# Patient Record
Sex: FEMALE | Race: WHITE | Employment: OTHER | URBAN - METROPOLITAN AREA
[De-identification: names, ages, dates, MRNs, and addresses within clinical notes are randomized per-mention and may not be internally consistent; named-entity substitution may affect disease eponyms.]

---

## 2017-01-12 ENCOUNTER — OFFICE VISIT (OUTPATIENT)
Dept: PULMONOLOGY | Facility: CLINIC | Age: 67
End: 2017-01-12

## 2017-01-12 VITALS
SYSTOLIC BLOOD PRESSURE: 133 MMHG | RESPIRATION RATE: 18 BRPM | DIASTOLIC BLOOD PRESSURE: 76 MMHG | BODY MASS INDEX: 38.67 KG/M2 | HEART RATE: 109 BPM | HEIGHT: 61 IN | WEIGHT: 204.81 LBS | OXYGEN SATURATION: 97 %

## 2017-01-12 DIAGNOSIS — G47.33 OBSTRUCTIVE SLEEP APNEA SYNDROME: Primary | ICD-10-CM

## 2017-01-12 PROCEDURE — 99212 OFFICE O/P EST SF 10 MIN: CPT | Performed by: INTERNAL MEDICINE

## 2017-01-12 PROCEDURE — 99243 OFF/OP CNSLTJ NEW/EST LOW 30: CPT | Performed by: INTERNAL MEDICINE

## 2017-01-12 NOTE — PROGRESS NOTES
Dear  Tanya Swanson  :           As you know, Cushing is a 52-year-old female who I am now evaluating for sleep apnea.     HISTORY OF PRESENT ILLNESS: The patient underwent polysomnography in 2008 at which time she was found to have 29 respiratory events per h examination is unremarkable with pupils equal round and reactive to light and accommodation. Neck without adenopathy, thyromegaly, JVD nor bruit. Lungs clear to auscultation and percussion. Cardiac regular rate and rhythm no murmur.  Abdomen nontender, with

## 2017-02-06 ENCOUNTER — OFFICE VISIT (OUTPATIENT)
Dept: INTERNAL MEDICINE CLINIC | Facility: CLINIC | Age: 67
End: 2017-02-06

## 2017-02-06 VITALS
TEMPERATURE: 98 F | HEART RATE: 88 BPM | HEIGHT: 62 IN | OXYGEN SATURATION: 98 % | WEIGHT: 205 LBS | RESPIRATION RATE: 19 BRPM | SYSTOLIC BLOOD PRESSURE: 135 MMHG | BODY MASS INDEX: 37.73 KG/M2 | DIASTOLIC BLOOD PRESSURE: 85 MMHG

## 2017-02-06 DIAGNOSIS — I10 ESSENTIAL HYPERTENSION: ICD-10-CM

## 2017-02-06 DIAGNOSIS — G47.33 OBSTRUCTIVE SLEEP APNEA SYNDROME: ICD-10-CM

## 2017-02-06 DIAGNOSIS — L98.9 SKIN LESION: ICD-10-CM

## 2017-02-06 DIAGNOSIS — E11.9 TYPE 2 DIABETES MELLITUS WITHOUT COMPLICATION, WITHOUT LONG-TERM CURRENT USE OF INSULIN (HCC): Primary | ICD-10-CM

## 2017-02-06 LAB
ALBUMIN SERPL BCP-MCNC: 3.9 G/DL (ref 3.5–4.8)
ALBUMIN/GLOB SERPL: 1.6 {RATIO} (ref 1–2)
ALP SERPL-CCNC: 78 U/L (ref 32–100)
ALT SERPL-CCNC: 34 U/L (ref 14–54)
ANION GAP SERPL CALC-SCNC: 9 MMOL/L (ref 0–18)
AST SERPL-CCNC: 32 U/L (ref 15–41)
BILIRUB SERPL-MCNC: 0.3 MG/DL (ref 0.3–1.2)
BUN SERPL-MCNC: 15 MG/DL (ref 8–20)
BUN/CREAT SERPL: 19.2 (ref 10–20)
CALCIUM SERPL-MCNC: 9.2 MG/DL (ref 8.5–10.5)
CHLORIDE SERPL-SCNC: 104 MMOL/L (ref 95–110)
CO2 SERPL-SCNC: 26 MMOL/L (ref 22–32)
CREAT SERPL-MCNC: 0.78 MG/DL (ref 0.5–1.5)
GLOBULIN PLAS-MCNC: 2.4 G/DL (ref 2.5–3.7)
GLUCOSE SERPL-MCNC: 101 MG/DL (ref 70–99)
OSMOLALITY UR CALC.SUM OF ELEC: 289 MOSM/KG (ref 275–295)
POTASSIUM SERPL-SCNC: 4 MMOL/L (ref 3.3–5.1)
PROT SERPL-MCNC: 6.3 G/DL (ref 5.9–8.4)
SODIUM SERPL-SCNC: 139 MMOL/L (ref 136–144)

## 2017-02-06 PROCEDURE — 36415 COLL VENOUS BLD VENIPUNCTURE: CPT | Performed by: INTERNAL MEDICINE

## 2017-02-06 PROCEDURE — 99213 OFFICE O/P EST LOW 20 MIN: CPT | Performed by: INTERNAL MEDICINE

## 2017-02-06 PROCEDURE — 99212 OFFICE O/P EST SF 10 MIN: CPT | Performed by: INTERNAL MEDICINE

## 2017-02-06 RX ORDER — LISINOPRIL 10 MG/1
10 TABLET ORAL
Qty: 90 TABLET | Refills: 1 | Status: SHIPPED | OUTPATIENT
Start: 2017-02-06 | End: 2017-08-07

## 2017-02-06 RX ORDER — SIMVASTATIN 80 MG
TABLET ORAL
Qty: 90 TABLET | Refills: 1 | Status: SHIPPED | OUTPATIENT
Start: 2017-02-06 | End: 2017-08-07

## 2017-02-06 RX ORDER — PAROXETINE HYDROCHLORIDE 20 MG/1
TABLET, FILM COATED ORAL
Qty: 90 TABLET | Refills: 1 | Status: SHIPPED | OUTPATIENT
Start: 2017-02-06 | End: 2017-08-07

## 2017-02-06 NOTE — PATIENT INSTRUCTIONS
ASSESSMENT/PLAN:   Diagnoses and all orders for this visit:    Type 2 diabetes mellitus without complication, without long-term current use of insulin (HCC)  Sugar overall has done very well, will check labs today.  Encouraged her to continue the regular

## 2017-02-06 NOTE — PROGRESS NOTES
HPI:    Patient ID: Belkis Angeles is a 73year old female. HPI  Diabetes  Patient here for follow up of Diabetes. Has been taking medications regularly. Currently taking   Checks sugars 1 times daily.   Fasting sugars average 110-120  exercise constipation.    Skin:        Skin lesion left upper eyelid           Current Outpatient Prescriptions:  PAROXETINE HCL 20 MG Oral Tab TAKE 1 TABLET BY MOUTH EVERY MORNING Disp: 90 tablet Rfl: 0   SIMVASTATIN 80 MG Oral Tab TAKE 1 TABLET BY MOUTH AT BEDTIME mg total) by mouth once daily.  -     MetFORMIN HCl 500 MG Oral Tab; Take 1 tablet (500 mg total) by mouth 2 (two) times daily with meals.  -     PARoxetine HCl 20 MG Oral Tab; Take 1 tablet (20 mg total) by mouth. -     simvastatin 80 MG Oral Tab;  Take 1

## 2017-02-07 LAB — HBA1C MFR BLD: 6.5 % (ref 4–6)

## 2017-02-16 ENCOUNTER — OFFICE VISIT (OUTPATIENT)
Dept: SLEEP CENTER | Age: 67
End: 2017-02-16
Attending: INTERNAL MEDICINE
Payer: COMMERCIAL

## 2017-02-16 DIAGNOSIS — Z76.89 SLEEP CONCERN: Primary | ICD-10-CM

## 2017-02-16 PROCEDURE — 95811 POLYSOM 6/>YRS CPAP 4/> PARM: CPT

## 2017-02-19 NOTE — PROCEDURES
[facility]  Accredited by the [employer] ([employer])    PATIENT'S NAME: Akiko Jaime PHYSICIAN: Artice Simmonds, MD   REFERRING PHYSICIAN: Artice Simmonds, MD   PATIENT ACCOUNT #: [de-identified] LOCATION 12.1 events per hour. There were 29 periodic limb movements for a periodic limb movement index of 4.3 events per hour of which 0.7 per hour were associated with arousal.  The lowest desaturation was to 87%.   The average heart rate was 74 beats per minute,

## 2017-02-20 ENCOUNTER — TELEPHONE (OUTPATIENT)
Dept: PULMONOLOGY | Facility: CLINIC | Age: 67
End: 2017-02-20

## 2017-02-20 DIAGNOSIS — G47.33 OSA (OBSTRUCTIVE SLEEP APNEA): Primary | ICD-10-CM

## 2017-02-21 NOTE — TELEPHONE ENCOUNTER
V/o received from Dr. Bret Spurling for BiPAP 18/14 CWP with Bi-Flex set to 3 CWP using a ResMed AirFit medium F10 full face mask with heated in-line humidification.  DME rx, Titration, PSG, Office Visit Encounter from 1/12, & Pt Reg Facesheet faxed to [facility] [place] at #[phone]

## 2017-04-17 ENCOUNTER — OFFICE VISIT (OUTPATIENT)
Dept: PULMONOLOGY | Facility: CLINIC | Age: 67
End: 2017-04-17

## 2017-04-17 VITALS
RESPIRATION RATE: 18 BRPM | SYSTOLIC BLOOD PRESSURE: 138 MMHG | HEART RATE: 86 BPM | OXYGEN SATURATION: 97 % | BODY MASS INDEX: 38.16 KG/M2 | DIASTOLIC BLOOD PRESSURE: 80 MMHG | HEIGHT: 62 IN | WEIGHT: 207.38 LBS

## 2017-04-17 DIAGNOSIS — G47.33 OBSTRUCTIVE SLEEP APNEA SYNDROME: Primary | ICD-10-CM

## 2017-04-17 PROCEDURE — 99212 OFFICE O/P EST SF 10 MIN: CPT | Performed by: INTERNAL MEDICINE

## 2017-04-17 PROCEDURE — 99213 OFFICE O/P EST LOW 20 MIN: CPT | Performed by: INTERNAL MEDICINE

## 2017-04-17 NOTE — PROGRESS NOTES
The patient is 52-year-old female who I know well from prior evaluation comes in now for follow-up. She has severe sleep apnea which is worse in the supine position and she is now set up with BiPAP 21/17 cmH2O pressure.   She notes that she is more rested

## 2017-04-26 ENCOUNTER — OFFICE VISIT (OUTPATIENT)
Dept: GASTROENTEROLOGY | Facility: CLINIC | Age: 67
End: 2017-04-26

## 2017-04-26 ENCOUNTER — TELEPHONE (OUTPATIENT)
Dept: GASTROENTEROLOGY | Facility: CLINIC | Age: 67
End: 2017-04-26

## 2017-04-26 VITALS
WEIGHT: 204.81 LBS | SYSTOLIC BLOOD PRESSURE: 134 MMHG | DIASTOLIC BLOOD PRESSURE: 78 MMHG | HEIGHT: 62 IN | BODY MASS INDEX: 37.69 KG/M2 | HEART RATE: 98 BPM

## 2017-04-26 DIAGNOSIS — Z12.11 SCREENING FOR COLORECTAL CANCER: Primary | ICD-10-CM

## 2017-04-26 DIAGNOSIS — Z12.11 SCREENING FOR COLON CANCER: Primary | ICD-10-CM

## 2017-04-26 DIAGNOSIS — Z12.12 SCREENING FOR COLORECTAL CANCER: Primary | ICD-10-CM

## 2017-04-26 PROCEDURE — 99212 OFFICE O/P EST SF 10 MIN: CPT | Performed by: INTERNAL MEDICINE

## 2017-04-26 PROCEDURE — 99242 OFF/OP CONSLTJ NEW/EST SF 20: CPT | Performed by: INTERNAL MEDICINE

## 2017-04-26 NOTE — TELEPHONE ENCOUNTER
Scheduled for:  Colonoscopy  Provider Name: Raymond  Date:  5/31/17  Location:  [facility]  Sedation: IV   Time:  730am (630am arrival)  Prep: Suprep (pt notified rx sent in and to call office if too expensive or not covered)  Meds/Allergies Reconciled?:  ye

## 2017-04-26 NOTE — PATIENT INSTRUCTIONS
1.  Schedule screening colonoscopy following a Suprep. 2.  Hold diabetic medication the evening before and the morning of the procedure.

## 2017-04-26 NOTE — PROGRESS NOTES
HPI:    Patient ID: Nir Joyce is a 73year old female. HPI  The patient is referred by Dr. Beau Montez for colorectal cancer screening. She has had 2 colonoscopies previously at [facility].  The first revealed polyps.   The second did no Vitamins-Minerals (MULTI-VITAMIN/MINERALS) Oral Tab Take 1 tablet by mouth daily. Disp:  Rfl:    Fexofenadine HCl (ALLEGRA) 180 MG Oral Tab Take 180 mg by mouth daily.  Disp:  Rfl:      Allergies:  Aspirin                     Comment:Other reaction(s): Unkn Total Bilirubin      0.3-1.2 mg/dL 0.3 0.5   TOTAL PROTEIN      5.9-8.4 g/dL 6.3 6.6   Albumin      3.5-4.8 g/dL 3.9 3.9   GLOBULIN, TOTAL      2.5-3.7 g/dL 2.4 (L) 2.7   A/G RATIO      1.0-2.0 1.6 1.4   GFR/NON-      >60  >60   GFR/AFRIC

## 2017-05-01 ENCOUNTER — TELEPHONE (OUTPATIENT)
Dept: GASTROENTEROLOGY | Facility: CLINIC | Age: 67
End: 2017-05-01

## 2017-05-04 NOTE — TELEPHONE ENCOUNTER
Rescheduled for:  Colonscopy [id number]  Provider Name:  Dr. Isaias Badillo  Date:    From-5/31/17  To-6/7/17  Location:  [facility]  Sedation:  IV  Time:   From-0730  To-1030 (pt is aware to arrive at 0930)  Prep:  Suprep, mailed new instructions 5/4/17  Meds/Allergies Reconcile

## 2017-05-09 ENCOUNTER — PATIENT MESSAGE (OUTPATIENT)
Dept: INTERNAL MEDICINE CLINIC | Facility: CLINIC | Age: 67
End: 2017-05-09

## 2017-05-10 NOTE — TELEPHONE ENCOUNTER
Message left for Durene Severin in [facility] AT [facility]

## 2017-05-10 NOTE — TELEPHONE ENCOUNTER
Per Gabriela Roberts Dr, Vicente Martines is not in [facility] network.   Spoke to patient she was going to call her insurance, she has been going to this doctor for 30 years

## 2017-06-07 ENCOUNTER — SURGERY (OUTPATIENT)
Age: 67
End: 2017-06-07

## 2017-06-07 ENCOUNTER — HOSPITAL ENCOUNTER (OUTPATIENT)
Facility: HOSPITAL | Age: 67
Setting detail: HOSPITAL OUTPATIENT SURGERY
Discharge: HOME | End: 2017-06-07
Attending: INTERNAL MEDICINE | Admitting: INTERNAL MEDICINE
Payer: COMMERCIAL

## 2017-06-07 PROCEDURE — 45385 COLONOSCOPY W/LESION REMOVAL: CPT | Performed by: INTERNAL MEDICINE

## 2017-06-07 PROCEDURE — 0DBM8ZX EXCISION OF DESCENDING COLON, VIA NATURAL OR ARTIFICIAL OPENING ENDOSCOPIC, DIAGNOSTIC: ICD-10-PCS | Performed by: INTERNAL MEDICINE

## 2017-06-07 PROCEDURE — 99152 MOD SED SAME PHYS/QHP 5/>YRS: CPT | Performed by: INTERNAL MEDICINE

## 2017-06-07 PROCEDURE — 0DBN8ZX EXCISION OF SIGMOID COLON, VIA NATURAL OR ARTIFICIAL OPENING ENDOSCOPIC, DIAGNOSTIC: ICD-10-PCS | Performed by: INTERNAL MEDICINE

## 2017-06-07 PROCEDURE — 0DBL8ZX EXCISION OF TRANSVERSE COLON, VIA NATURAL OR ARTIFICIAL OPENING ENDOSCOPIC, DIAGNOSTIC: ICD-10-PCS | Performed by: INTERNAL MEDICINE

## 2017-06-07 PROCEDURE — 0DBP8ZX EXCISION OF RECTUM, VIA NATURAL OR ARTIFICIAL OPENING ENDOSCOPIC, DIAGNOSTIC: ICD-10-PCS | Performed by: INTERNAL MEDICINE

## 2017-06-07 PROCEDURE — 99153 MOD SED SAME PHYS/QHP EA: CPT | Performed by: INTERNAL MEDICINE

## 2017-06-07 RX ORDER — SODIUM CHLORIDE, SODIUM LACTATE, POTASSIUM CHLORIDE, CALCIUM CHLORIDE 600; 310; 30; 20 MG/100ML; MG/100ML; MG/100ML; MG/100ML
INJECTION, SOLUTION INTRAVENOUS CONTINUOUS
Status: DISCONTINUED | OUTPATIENT
Start: 2017-06-07 | End: 2017-06-07

## 2017-06-07 RX ORDER — MIDAZOLAM HYDROCHLORIDE 1 MG/ML
INJECTION INTRAMUSCULAR; INTRAVENOUS
Status: DISCONTINUED | OUTPATIENT
Start: 2017-06-07 | End: 2017-06-07

## 2017-06-07 RX ORDER — MIDAZOLAM HYDROCHLORIDE 1 MG/ML
1 INJECTION INTRAMUSCULAR; INTRAVENOUS EVERY 5 MIN PRN
Status: DISCONTINUED | OUTPATIENT
Start: 2017-06-07 | End: 2017-06-07

## 2017-06-07 RX ORDER — SODIUM CHLORIDE 0.9 % (FLUSH) 0.9 %
10 SYRINGE (ML) INJECTION AS NEEDED
Status: DISCONTINUED | OUTPATIENT
Start: 2017-06-07 | End: 2017-06-07

## 2017-06-07 NOTE — H&P
History & Physical Examination    Patient Name: Yanet Salas  MRN: [id number]  CSN: [id number]  YOB: 1950    Diagnosis: Colorectal cancer screening        Prescriptions prior to admission:  Na Sulfate-K Sulfate-Mg Sulf (SUPREP BOWEL WI headaches    • Diabetes mellitus ([facility] [address]    • Essential hypertension    • Obesity    • Colon polyp    • High blood pressure          Past Surgical History    BREAST BIOPSY Left     MYOMECTOMY 5/> INTRAMURAL MYOMAS &/OR TOTAL WT >250 GMS, ABDOMINAL APPROACH

## 2017-06-07 NOTE — OPERATIVE REPORT
[facility] Endoscopy Report      Date of Procedure:  06/07/2017      Preoperative Diagnosis:  Colorectal cancer screening      Postoperative Diagnosis:  1. Colon polyps  2.   Minimal colonic diverticulosis      Procedure:    Colonoscopy wit polyp which was cold snare excised and retrieved via suction. Inspection of all the above sites revealed no evidence of ongoing bleeding. There were a few tiny diverticula seen in at least the transverse colon without current complication.   There were

## 2017-06-08 VITALS
OXYGEN SATURATION: 96 % | RESPIRATION RATE: 10 BRPM | BODY MASS INDEX: 36.8 KG/M2 | WEIGHT: 200 LBS | SYSTOLIC BLOOD PRESSURE: 130 MMHG | DIASTOLIC BLOOD PRESSURE: 64 MMHG | HEART RATE: 75 BPM | HEIGHT: 62 IN

## 2017-06-12 ENCOUNTER — TELEPHONE (OUTPATIENT)
Dept: GASTROENTEROLOGY | Facility: CLINIC | Age: 67
End: 2017-06-12

## 2017-06-12 NOTE — TELEPHONE ENCOUNTER
----- Message from Israel Cox MD sent at 6/9/2017  7:35 PM CDT -----  Please see the following my chart message sent to the patient:    \"Marino,    I left a message on your voicemail.       Your colonoscopy revealed 6 small polyps which were remov

## 2017-08-07 ENCOUNTER — OFFICE VISIT (OUTPATIENT)
Dept: INTERNAL MEDICINE CLINIC | Facility: CLINIC | Age: 67
End: 2017-08-07

## 2017-08-07 VITALS
HEIGHT: 62 IN | WEIGHT: 209 LBS | HEART RATE: 88 BPM | RESPIRATION RATE: 20 BRPM | DIASTOLIC BLOOD PRESSURE: 76 MMHG | SYSTOLIC BLOOD PRESSURE: 138 MMHG | BODY MASS INDEX: 38.46 KG/M2 | TEMPERATURE: 98 F

## 2017-08-07 DIAGNOSIS — E11.9 TYPE 2 DIABETES MELLITUS WITHOUT COMPLICATION, WITHOUT LONG-TERM CURRENT USE OF INSULIN (HCC): Primary | ICD-10-CM

## 2017-08-07 DIAGNOSIS — Z78.0 MENOPAUSE: ICD-10-CM

## 2017-08-07 DIAGNOSIS — Z12.31 VISIT FOR SCREENING MAMMOGRAM: ICD-10-CM

## 2017-08-07 DIAGNOSIS — I10 ESSENTIAL HYPERTENSION: ICD-10-CM

## 2017-08-07 PROCEDURE — 99212 OFFICE O/P EST SF 10 MIN: CPT | Performed by: INTERNAL MEDICINE

## 2017-08-07 PROCEDURE — 90471 IMMUNIZATION ADMIN: CPT | Performed by: INTERNAL MEDICINE

## 2017-08-07 PROCEDURE — 99213 OFFICE O/P EST LOW 20 MIN: CPT | Performed by: INTERNAL MEDICINE

## 2017-08-07 PROCEDURE — 90736 HZV VACCINE LIVE SUBQ: CPT | Performed by: INTERNAL MEDICINE

## 2017-08-07 PROCEDURE — 90472 IMMUNIZATION ADMIN EACH ADD: CPT | Performed by: INTERNAL MEDICINE

## 2017-08-07 PROCEDURE — 90670 PCV13 VACCINE IM: CPT | Performed by: INTERNAL MEDICINE

## 2017-08-07 RX ORDER — SIMVASTATIN 80 MG
TABLET ORAL
Qty: 90 TABLET | Refills: 1 | Status: SHIPPED | OUTPATIENT
Start: 2017-08-07 | End: 2018-02-07

## 2017-08-07 RX ORDER — PAROXETINE HYDROCHLORIDE 20 MG/1
TABLET, FILM COATED ORAL
Qty: 90 TABLET | Refills: 1 | Status: SHIPPED | OUTPATIENT
Start: 2017-08-07 | End: 2018-02-07

## 2017-08-07 RX ORDER — LISINOPRIL 10 MG/1
10 TABLET ORAL
Qty: 90 TABLET | Refills: 1 | Status: SHIPPED | OUTPATIENT
Start: 2017-08-07 | End: 2018-02-07

## 2017-08-07 NOTE — PATIENT INSTRUCTIONS
ASSESSMENT/PLAN:   Diagnoses and all orders for this visit:    Type 2 diabetes mellitus without complication, without long-term current use of insulin (HCC)  -     COMP METABOLIC PANEL (14); Future  -     HEMOGLOBIN A1C; Future  -     LIPID PANEL;  Future

## 2017-08-07 NOTE — PROGRESS NOTES
HPI:    Patient ID: Ignacio Vilchis is a 78year old female. HPI  Diabetes  Patient here for follow up of Diabetes. Has been taking medications regularly. Currently taking metformin only  Checks sugars 1 times daily.   Fasting sugars average low Inhaler Rfl: 3   Multiple Vitamins-Minerals (MULTI-VITAMIN/MINERALS) Oral Tab Take 1 tablet by mouth daily. Disp:  Rfl:    Fexofenadine HCl (ALLEGRA) 180 MG Oral Tab Take 180 mg by mouth daily.  Disp:  Rfl:      Allergies:  Aspirin                     Comme meals.  -     simvastatin 80 MG Oral Tab; TAKE 1 TABLET BY MOUTH AT BEDTIME  -     PARoxetine HCl 20 MG Oral Tab; TAKE 1 TABLET BY MOUTH EVERY MORNING            [id number]

## 2017-11-07 ENCOUNTER — TELEPHONE (OUTPATIENT)
Dept: INTERNAL MEDICINE CLINIC | Facility: CLINIC | Age: 67
End: 2017-11-07

## 2017-11-07 NOTE — TELEPHONE ENCOUNTER
Patient informed is due for diabetic eye exam, states will schedule eye exam appointment with Dr Vicente Martines and will fax report. Fax number given to patient [phone] Miguel Lima.

## 2018-01-23 NOTE — TELEPHONE ENCOUNTER
From: Donna Welsh  Sent: 1/23/2018 8:37 AM CST  Subject: Medication Renewal Request    Donna Welsh would like a refill of the following medications:     MetFORMIN HCl 500 MG Oral Tab Denver Brantley MD]   Patient Comment: I will run

## 2018-01-23 NOTE — TELEPHONE ENCOUNTER
Refilled x 1 per protocol to last until appt.     Diabetes Medications  Protocol Criteria:  · Appointment scheduled in the past 6 months or the next 3 months  · A1C < 7.5 in the past 6 months  · Creatinine in the past 12 months  · Creatinine result < 1.5

## 2018-02-03 ENCOUNTER — APPOINTMENT (OUTPATIENT)
Dept: LAB | Facility: HOSPITAL | Age: 68
End: 2018-02-03
Attending: INTERNAL MEDICINE
Payer: COMMERCIAL

## 2018-02-03 DIAGNOSIS — E11.9 TYPE 2 DIABETES MELLITUS WITHOUT COMPLICATION, WITHOUT LONG-TERM CURRENT USE OF INSULIN (HCC): ICD-10-CM

## 2018-02-03 LAB
ALBUMIN SERPL BCP-MCNC: 3.9 G/DL (ref 3.5–4.8)
ALBUMIN/GLOB SERPL: 1.6 {RATIO} (ref 1–2)
ALP SERPL-CCNC: 56 U/L (ref 32–100)
ALT SERPL-CCNC: 19 U/L (ref 14–54)
ANION GAP SERPL CALC-SCNC: 10 MMOL/L (ref 0–18)
AST SERPL-CCNC: 20 U/L (ref 15–41)
BILIRUB SERPL-MCNC: 0.6 MG/DL (ref 0.3–1.2)
BUN SERPL-MCNC: 12 MG/DL (ref 8–20)
BUN/CREAT SERPL: 18.5 (ref 10–20)
CALCIUM SERPL-MCNC: 9.2 MG/DL (ref 8.5–10.5)
CHLORIDE SERPL-SCNC: 102 MMOL/L (ref 95–110)
CHOLEST SERPL-MCNC: 158 MG/DL (ref 110–200)
CO2 SERPL-SCNC: 28 MMOL/L (ref 22–32)
CREAT SERPL-MCNC: 0.65 MG/DL (ref 0.5–1.5)
CREAT UR-MCNC: 125.7 MG/DL
GLOBULIN PLAS-MCNC: 2.5 G/DL (ref 2.5–3.7)
GLUCOSE SERPL-MCNC: 119 MG/DL (ref 70–99)
HBA1C MFR BLD: 6.5 % (ref 4–6)
HDLC SERPL-MCNC: 32 MG/DL
LDLC SERPL CALC-MCNC: 83 MG/DL (ref 0–99)
MICROALBUMIN UR-MCNC: 0.3 MG/DL (ref 0–1.8)
MICROALBUMIN/CREAT UR: 2.4 MG/G{CREAT} (ref 0–20)
NONHDLC SERPL-MCNC: 126 MG/DL
OSMOLALITY UR CALC.SUM OF ELEC: 291 MOSM/KG (ref 275–295)
POTASSIUM SERPL-SCNC: 4.4 MMOL/L (ref 3.3–5.1)
PROT SERPL-MCNC: 6.4 G/DL (ref 5.9–8.4)
SODIUM SERPL-SCNC: 140 MMOL/L (ref 136–144)
TRIGL SERPL-MCNC: 215 MG/DL (ref 1–149)

## 2018-02-03 PROCEDURE — 82570 ASSAY OF URINE CREATININE: CPT

## 2018-02-03 PROCEDURE — 82043 UR ALBUMIN QUANTITATIVE: CPT

## 2018-02-03 PROCEDURE — 36415 COLL VENOUS BLD VENIPUNCTURE: CPT

## 2018-02-03 PROCEDURE — 83036 HEMOGLOBIN GLYCOSYLATED A1C: CPT

## 2018-02-03 PROCEDURE — 80061 LIPID PANEL: CPT

## 2018-02-03 PROCEDURE — 80053 COMPREHEN METABOLIC PANEL: CPT

## 2018-02-07 ENCOUNTER — OFFICE VISIT (OUTPATIENT)
Dept: INTERNAL MEDICINE CLINIC | Facility: CLINIC | Age: 68
End: 2018-02-07

## 2018-02-07 VITALS
WEIGHT: 206 LBS | DIASTOLIC BLOOD PRESSURE: 72 MMHG | SYSTOLIC BLOOD PRESSURE: 136 MMHG | BODY MASS INDEX: 37.91 KG/M2 | HEART RATE: 87 BPM | HEIGHT: 62 IN

## 2018-02-07 DIAGNOSIS — I10 ESSENTIAL HYPERTENSION: ICD-10-CM

## 2018-02-07 DIAGNOSIS — E11.9 TYPE 2 DIABETES MELLITUS WITHOUT COMPLICATION, WITHOUT LONG-TERM CURRENT USE OF INSULIN (HCC): Primary | ICD-10-CM

## 2018-02-07 PROCEDURE — 99214 OFFICE O/P EST MOD 30 MIN: CPT | Performed by: INTERNAL MEDICINE

## 2018-02-07 PROCEDURE — 99212 OFFICE O/P EST SF 10 MIN: CPT | Performed by: INTERNAL MEDICINE

## 2018-02-07 RX ORDER — PAROXETINE HYDROCHLORIDE 20 MG/1
TABLET, FILM COATED ORAL
Qty: 90 TABLET | Refills: 1 | Status: SHIPPED | OUTPATIENT
Start: 2018-02-07 | End: 2018-08-22

## 2018-02-07 RX ORDER — LISINOPRIL 10 MG/1
10 TABLET ORAL
Qty: 90 TABLET | Refills: 1 | Status: SHIPPED | OUTPATIENT
Start: 2018-02-07 | End: 2018-08-22

## 2018-02-07 RX ORDER — SIMVASTATIN 80 MG
TABLET ORAL
Qty: 90 TABLET | Refills: 1 | Status: SHIPPED | OUTPATIENT
Start: 2018-02-07 | End: 2018-08-22

## 2018-02-07 NOTE — PATIENT INSTRUCTIONS
ASSESSMENT/PLAN:   Diagnoses and all orders for this visit:    Type 2 diabetes mellitus without complication, without long-term current use of insulin (HCC)  Sugars overall doing very well, to continue the regular exercise and diet  Essential hypertension

## 2018-02-07 NOTE — PROGRESS NOTES
HPI:    Patient ID: Carlie Morataya is a 78year old female. HPI  Diabetes  Patient here for follow up of Diabetes. Has been taking medications regularly.     Currently taking metformin only  Checks sugars not checking, machine broke, ordered new m shortness of breath. Cardiovascular: Negative for chest pain, palpitations and leg swelling.            Current Outpatient Prescriptions:  simvastatin 80 MG Oral Tab TAKE 1 TABLET BY MOUTH AT BEDTIME Disp: 90 tablet Rfl: 1   PARoxetine HCl 20 MG Oral Tab and get visual fields if this bothers her. Other orders  -     simvastatin 80 MG Oral Tab; TAKE 1 TABLET BY MOUTH AT BEDTIME  -     PARoxetine HCl 20 MG Oral Tab; TAKE 1 TABLET BY MOUTH EVERY MORNING  -     MetFORMIN HCl 500 MG Oral Tab;  Take 1 tablet

## 2018-03-31 ENCOUNTER — HOSPITAL ENCOUNTER (OUTPATIENT)
Dept: BONE DENSITY | Age: 68
Discharge: HOME | End: 2018-03-31
Attending: INTERNAL MEDICINE
Payer: COMMERCIAL

## 2018-03-31 ENCOUNTER — HOSPITAL ENCOUNTER (OUTPATIENT)
Dept: MAMMOGRAPHY | Age: 68
Discharge: HOME | End: 2018-03-31
Attending: INTERNAL MEDICINE
Payer: COMMERCIAL

## 2018-03-31 DIAGNOSIS — Z78.0 MENOPAUSE: ICD-10-CM

## 2018-03-31 DIAGNOSIS — Z12.31 VISIT FOR SCREENING MAMMOGRAM: ICD-10-CM

## 2018-03-31 PROCEDURE — 77067 SCR MAMMO BI INCL CAD: CPT | Performed by: INTERNAL MEDICINE

## 2018-03-31 PROCEDURE — 77080 DXA BONE DENSITY AXIAL: CPT | Performed by: INTERNAL MEDICINE

## 2018-05-31 NOTE — PROGRESS NOTES
Patient given written instructions regarding labs, imaging, medications, referrals, dietary and lifestyle management, and return visit.    Yoav Wakefield MD       Sent results via [address]

## 2018-08-06 ENCOUNTER — TELEPHONE (OUTPATIENT)
Dept: INTERNAL MEDICINE CLINIC | Facility: CLINIC | Age: 68
End: 2018-08-06

## 2018-08-06 DIAGNOSIS — E11.9 TYPE 2 DIABETES MELLITUS WITHOUT COMPLICATION, WITHOUT LONG-TERM CURRENT USE OF INSULIN (HCC): Primary | ICD-10-CM

## 2018-08-06 NOTE — TELEPHONE ENCOUNTER
Patient called requesting orders for labs that she is due for, please call patient when orders are completed.

## 2018-08-21 ENCOUNTER — LAB ENCOUNTER (OUTPATIENT)
Dept: LAB | Facility: HOSPITAL | Age: 68
End: 2018-08-21
Attending: INTERNAL MEDICINE
Payer: COMMERCIAL

## 2018-08-21 DIAGNOSIS — E11.9 TYPE 2 DIABETES MELLITUS WITHOUT COMPLICATION, WITHOUT LONG-TERM CURRENT USE OF INSULIN (HCC): ICD-10-CM

## 2018-08-21 LAB
ALBUMIN SERPL BCP-MCNC: 4 G/DL (ref 3.5–4.8)
ALBUMIN/GLOB SERPL: 1.7 {RATIO} (ref 1–2)
ALP SERPL-CCNC: 69 U/L (ref 32–100)
ALT SERPL-CCNC: 21 U/L (ref 14–54)
ANION GAP SERPL CALC-SCNC: 8 MMOL/L (ref 0–18)
AST SERPL-CCNC: 21 U/L (ref 15–41)
BILIRUB SERPL-MCNC: 0.4 MG/DL (ref 0.3–1.2)
BUN SERPL-MCNC: 14 MG/DL (ref 8–20)
BUN/CREAT SERPL: 18.9 (ref 10–20)
CALCIUM SERPL-MCNC: 9.2 MG/DL (ref 8.5–10.5)
CHLORIDE SERPL-SCNC: 102 MMOL/L (ref 95–110)
CO2 SERPL-SCNC: 27 MMOL/L (ref 22–32)
CREAT SERPL-MCNC: 0.74 MG/DL (ref 0.5–1.5)
GLOBULIN PLAS-MCNC: 2.4 G/DL (ref 2.5–3.7)
GLUCOSE SERPL-MCNC: 92 MG/DL (ref 70–99)
OSMOLALITY UR CALC.SUM OF ELEC: 284 MOSM/KG (ref 275–295)
PATIENT FASTING: NO
POTASSIUM SERPL-SCNC: 4.2 MMOL/L (ref 3.3–5.1)
PROT SERPL-MCNC: 6.4 G/DL (ref 5.9–8.4)
SODIUM SERPL-SCNC: 137 MMOL/L (ref 136–144)

## 2018-08-21 PROCEDURE — 83036 HEMOGLOBIN GLYCOSYLATED A1C: CPT

## 2018-08-21 PROCEDURE — 36415 COLL VENOUS BLD VENIPUNCTURE: CPT

## 2018-08-21 PROCEDURE — 80053 COMPREHEN METABOLIC PANEL: CPT

## 2018-08-22 ENCOUNTER — OFFICE VISIT (OUTPATIENT)
Dept: INTERNAL MEDICINE CLINIC | Facility: CLINIC | Age: 68
End: 2018-08-22
Payer: COMMERCIAL

## 2018-08-22 ENCOUNTER — TELEPHONE (OUTPATIENT)
Dept: INTERNAL MEDICINE CLINIC | Facility: CLINIC | Age: 68
End: 2018-08-22

## 2018-08-22 VITALS
HEART RATE: 108 BPM | DIASTOLIC BLOOD PRESSURE: 76 MMHG | BODY MASS INDEX: 36.25 KG/M2 | WEIGHT: 197 LBS | SYSTOLIC BLOOD PRESSURE: 135 MMHG | HEIGHT: 62 IN

## 2018-08-22 DIAGNOSIS — I10 ESSENTIAL HYPERTENSION: ICD-10-CM

## 2018-08-22 DIAGNOSIS — G47.33 OBSTRUCTIVE SLEEP APNEA SYNDROME: ICD-10-CM

## 2018-08-22 DIAGNOSIS — E11.9 TYPE 2 DIABETES MELLITUS WITHOUT COMPLICATION, WITHOUT LONG-TERM CURRENT USE OF INSULIN (HCC): Primary | ICD-10-CM

## 2018-08-22 LAB — HBA1C MFR BLD: 6.3 % (ref 4–6)

## 2018-08-22 PROCEDURE — 99213 OFFICE O/P EST LOW 20 MIN: CPT | Performed by: INTERNAL MEDICINE

## 2018-08-22 RX ORDER — PAROXETINE HYDROCHLORIDE 20 MG/1
TABLET, FILM COATED ORAL
Qty: 90 TABLET | Refills: 1 | Status: SHIPPED | OUTPATIENT
Start: 2018-08-22 | End: 2019-02-20

## 2018-08-22 RX ORDER — SIMVASTATIN 80 MG
TABLET ORAL
Qty: 90 TABLET | Refills: 1 | Status: SHIPPED | OUTPATIENT
Start: 2018-08-22 | End: 2019-02-20

## 2018-08-22 RX ORDER — LISINOPRIL 10 MG/1
10 TABLET ORAL
Qty: 90 TABLET | Refills: 1 | Status: SHIPPED | OUTPATIENT
Start: 2018-08-22 | End: 2019-02-20

## 2018-08-22 NOTE — TELEPHONE ENCOUNTER
Rx request for Simvastatin 80 mg, paroxetine HCI 20 mg, Metformin  mg and Lisinopril 10 mg PASSED Protocols. rx filled. No further action required.    Cholesterol Medications  Protocol Criteria:  · Appointment scheduled in the past 12 months or in th Bernardine Lesch, MD    Office Visit    1 year ago Type 2 diabetes mellitus without complication, without long-term current use of insulin [facility])    Antonio Beckman MD    Office Visit    1 year ago Screening for colorectal ca 08/21/2018   ALT 21 08/21/2018   BILT 0.4 08/21/2018   TP 6.4 08/21/2018   ALB 4.0 08/21/2018    08/21/2018   K 4.2 08/21/2018    08/21/2018   CO2 27 08/21/2018   GLOBULIN 2.4 (L) 08/21/2018   AGRATIO 1.4 08/05/2016   ANIONGAP 8 08/21/2018   OS

## 2018-08-22 NOTE — PROGRESS NOTES
HPI:    Patient ID: Stephanie Hernandez is a 80year old female. HPI  Diabetes  Patient here for follow up of Diabetes. Has been taking medications regularly. Currently taking metformin  Checks sugars not checking, machine broke.    moderately activ Oral Tab Take 1 tablet (10 mg total) by mouth once daily. Disp: 90 tablet Rfl: 1   Multiple Vitamins-Minerals (MULTI-VITAMIN/MINERALS) Oral Tab Take 1 tablet by mouth daily.  Disp:  Rfl:    Fexofenadine HCl (ALLEGRA) 180 MG Oral Tab Take 180 mg by mouth yolanda

## 2018-08-22 NOTE — PATIENT INSTRUCTIONS
ASSESSMENT/PLAN:   Essential hypertension  BP overall doing very well, continue current meds and regular exercise.       Sleep apnea  Recommend that she have her sleep data downloaded and will refer to a sleep specialist    Di[facility]  Sugar do

## 2018-08-22 NOTE — TELEPHONE ENCOUNTER
Current Outpatient Prescriptions:     •  simvastatin 80 MG Oral Tab, TAKE 1 TABLET BY MOUTH AT BEDTIME, Disp: 90 tablet, Rfl: 1    •  PARoxetine HCl 20 MG Oral Tab, TAKE 1 TABLET BY MOUTH EVERY MORNING, Disp: 90 tablet, Rfl: 1    •  MetFORMIN HCl 500 MG

## 2019-02-20 ENCOUNTER — OFFICE VISIT (OUTPATIENT)
Dept: INTERNAL MEDICINE CLINIC | Facility: CLINIC | Age: 69
End: 2019-02-20
Payer: COMMERCIAL

## 2019-02-20 VITALS
SYSTOLIC BLOOD PRESSURE: 138 MMHG | TEMPERATURE: 99 F | RESPIRATION RATE: 18 BRPM | BODY MASS INDEX: 36.44 KG/M2 | DIASTOLIC BLOOD PRESSURE: 85 MMHG | HEIGHT: 62 IN | WEIGHT: 198 LBS

## 2019-02-20 DIAGNOSIS — E11.00 TYPE 2 DIABETES MELLITUS WITH HYPEROSMOLARITY WITHOUT COMA, WITHOUT LONG-TERM CURRENT USE OF INSULIN (HCC): Primary | ICD-10-CM

## 2019-02-20 DIAGNOSIS — Z12.39 SCREENING FOR BREAST CANCER: ICD-10-CM

## 2019-02-20 DIAGNOSIS — G47.33 OSA ON CPAP: ICD-10-CM

## 2019-02-20 DIAGNOSIS — Z99.89 OSA ON CPAP: ICD-10-CM

## 2019-02-20 DIAGNOSIS — E78.00 HYPERCHOLESTEREMIA: ICD-10-CM

## 2019-02-20 PROCEDURE — 99213 OFFICE O/P EST LOW 20 MIN: CPT | Performed by: INTERNAL MEDICINE

## 2019-02-20 RX ORDER — SIMVASTATIN 80 MG
TABLET ORAL
Qty: 90 TABLET | Refills: 1 | Status: SHIPPED | OUTPATIENT
Start: 2019-02-20 | End: 2019-08-12

## 2019-02-20 RX ORDER — FEXOFENADINE HCL 180 MG/1
180 TABLET ORAL DAILY
Qty: 90 TABLET | Refills: 0 | Status: SHIPPED | OUTPATIENT
Start: 2019-02-20 | End: 2019-08-12

## 2019-02-20 RX ORDER — FLUTICASONE PROPIONATE 50 MCG
2 SPRAY, SUSPENSION (ML) NASAL DAILY
Qty: 1 INHALER | Refills: 3 | Status: SHIPPED | OUTPATIENT
Start: 2019-02-20 | End: 2020-03-02

## 2019-02-20 RX ORDER — LISINOPRIL 10 MG/1
10 TABLET ORAL
Qty: 90 TABLET | Refills: 1 | Status: SHIPPED | OUTPATIENT
Start: 2019-02-20 | End: 2019-08-12

## 2019-02-20 RX ORDER — MULTIVIT-MIN/IRON FUM/FOLIC AC 7.5 MG-4
1 TABLET ORAL DAILY
Qty: 90 TABLET | Refills: 0 | Status: SHIPPED | OUTPATIENT
Start: 2019-02-20

## 2019-02-20 RX ORDER — PAROXETINE HYDROCHLORIDE 20 MG/1
TABLET, FILM COATED ORAL
Qty: 90 TABLET | Refills: 1 | Status: SHIPPED | OUTPATIENT
Start: 2019-02-20 | End: 2019-08-12

## 2019-02-20 NOTE — PROGRESS NOTES
HPI:    Patient ID: Geroge Kehr is a 80year old female. HPI she came today for 6 months follow-up on her hypertension and diabetes. htn   She states that she is checking her blood pressure at home and is normally well controlled is 120/80. hallucinations and sleep disturbance. The patient is not nervous/anxious. Current Outpatient Medications:  lisinopril 10 MG Oral Tab Take 1 tablet (10 mg total) by mouth once daily.  Disp: 90 tablet Rfl: 1   metFORMIN HCl 500 MG Oral Tab Take 1 t Disease Mother    • Hypertension Sister    • Psychiatric Sister    • Ovarian Cancer Paternal Aunt         45s      Social History: Social History    Tobacco Use      Smoking status: Never Smoker      Smokeless tobacco: Never Used    Alcohol use:  Yes      A shifting dullness, no distension and no mass. There is no hepatosplenomegaly. There is no tenderness. There is no rigidity, no rebound, no guarding and no CVA tenderness. Musculoskeletal: Normal range of motion. She exhibits no edema or tenderness.    Lym 20 MG Oral Tab 90 tablet 1     Sig: TAKE 1 TABLET BY MOUTH EVERY MORNING   • simvastatin 80 MG Oral Tab 90 tablet 1     Sig: TAKE 1 TABLET BY MOUTH AT BEDTIME   • Fluticasone Propionate (FLONASE) 50 MCG/ACT Nasal Suspension 1 Inhaler 3     Si sprays by

## 2019-02-20 NOTE — PATIENT INSTRUCTIONS
Type 2 diabetes mellitus with hyperosmolarity without coma, without long-term current use of insulin (hcc)  (primary encounter diagnosis) we will check her HbA1c today advised her to continue with diet and exercise advised her to check her blood sugar fast

## 2019-08-09 ENCOUNTER — APPOINTMENT (OUTPATIENT)
Dept: LAB | Facility: HOSPITAL | Age: 69
End: 2019-08-09
Attending: INTERNAL MEDICINE
Payer: COMMERCIAL

## 2019-08-09 DIAGNOSIS — E78.00 HYPERCHOLESTEREMIA: ICD-10-CM

## 2019-08-09 DIAGNOSIS — E11.00 TYPE 2 DIABETES MELLITUS WITH HYPEROSMOLARITY WITHOUT COMA, WITHOUT LONG-TERM CURRENT USE OF INSULIN (HCC): ICD-10-CM

## 2019-08-09 LAB
ALBUMIN SERPL-MCNC: 3.9 G/DL (ref 3.4–5)
ALBUMIN/GLOB SERPL: 1.2 {RATIO} (ref 1–2)
ALP LIVER SERPL-CCNC: 91 U/L (ref 55–142)
ALT SERPL-CCNC: 42 U/L (ref 13–56)
ANION GAP SERPL CALC-SCNC: 7 MMOL/L (ref 0–18)
AST SERPL-CCNC: 30 U/L (ref 15–37)
BILIRUB SERPL-MCNC: 0.3 MG/DL (ref 0.1–2)
BUN BLD-MCNC: 16 MG/DL (ref 7–18)
BUN/CREAT SERPL: 20 (ref 10–20)
CALCIUM BLD-MCNC: 9.2 MG/DL (ref 8.5–10.1)
CHLORIDE SERPL-SCNC: 107 MMOL/L (ref 98–112)
CHOLEST SMN-MCNC: 155 MG/DL (ref ?–200)
CO2 SERPL-SCNC: 29 MMOL/L (ref 21–32)
CREAT BLD-MCNC: 0.8 MG/DL (ref 0.55–1.02)
CREAT UR-SCNC: 105 MG/DL
EST. AVERAGE GLUCOSE BLD GHB EST-MCNC: 137 MG/DL (ref 68–126)
GLOBULIN PLAS-MCNC: 3.2 G/DL (ref 2.8–4.4)
GLUCOSE BLD-MCNC: 125 MG/DL (ref 70–99)
HBA1C MFR BLD HPLC: 6.4 % (ref ?–5.7)
HDLC SERPL-MCNC: 39 MG/DL (ref 40–59)
LDLC SERPL CALC-MCNC: 78 MG/DL (ref ?–100)
M PROTEIN MFR SERPL ELPH: 7.1 G/DL (ref 6.4–8.2)
MICROALBUMIN UR-MCNC: 0.88 MG/DL
MICROALBUMIN/CREAT 24H UR-RTO: 8.4 UG/MG (ref ?–30)
NONHDLC SERPL-MCNC: 116 MG/DL (ref ?–130)
OSMOLALITY SERPL CALC.SUM OF ELEC: 299 MOSM/KG (ref 275–295)
PATIENT FASTING: YES
PATIENT FASTING: YES
POTASSIUM SERPL-SCNC: 4.5 MMOL/L (ref 3.5–5.1)
SODIUM SERPL-SCNC: 143 MMOL/L (ref 136–145)
TRIGL SERPL-MCNC: 188 MG/DL (ref 30–149)
VLDLC SERPL CALC-MCNC: 38 MG/DL (ref 0–30)

## 2019-08-09 PROCEDURE — 82043 UR ALBUMIN QUANTITATIVE: CPT

## 2019-08-09 PROCEDURE — 80053 COMPREHEN METABOLIC PANEL: CPT

## 2019-08-09 PROCEDURE — 36415 COLL VENOUS BLD VENIPUNCTURE: CPT

## 2019-08-09 PROCEDURE — 82570 ASSAY OF URINE CREATININE: CPT

## 2019-08-09 PROCEDURE — 83036 HEMOGLOBIN GLYCOSYLATED A1C: CPT

## 2019-08-09 PROCEDURE — 80061 LIPID PANEL: CPT

## 2019-08-12 ENCOUNTER — OFFICE VISIT (OUTPATIENT)
Dept: INTERNAL MEDICINE CLINIC | Facility: CLINIC | Age: 69
End: 2019-08-12
Payer: COMMERCIAL

## 2019-08-12 VITALS
BODY MASS INDEX: 36.25 KG/M2 | RESPIRATION RATE: 18 BRPM | HEIGHT: 62 IN | TEMPERATURE: 99 F | HEART RATE: 84 BPM | SYSTOLIC BLOOD PRESSURE: 133 MMHG | WEIGHT: 197 LBS | DIASTOLIC BLOOD PRESSURE: 76 MMHG

## 2019-08-12 DIAGNOSIS — Z23 NEED FOR VACCINATION: Primary | ICD-10-CM

## 2019-08-12 PROCEDURE — 99214 OFFICE O/P EST MOD 30 MIN: CPT | Performed by: INTERNAL MEDICINE

## 2019-08-12 PROCEDURE — 90732 PPSV23 VACC 2 YRS+ SUBQ/IM: CPT | Performed by: INTERNAL MEDICINE

## 2019-08-12 PROCEDURE — 90471 IMMUNIZATION ADMIN: CPT | Performed by: INTERNAL MEDICINE

## 2019-08-12 RX ORDER — SIMVASTATIN 80 MG
TABLET ORAL
Qty: 90 TABLET | Refills: 1 | Status: SHIPPED | OUTPATIENT
Start: 2019-08-12 | End: 2020-03-02

## 2019-08-12 RX ORDER — FEXOFENADINE HCL 180 MG/1
180 TABLET ORAL DAILY
Qty: 90 TABLET | Refills: 0 | Status: SHIPPED | OUTPATIENT
Start: 2019-08-12 | End: 2020-03-02

## 2019-08-12 RX ORDER — PAROXETINE HYDROCHLORIDE 20 MG/1
TABLET, FILM COATED ORAL
Qty: 90 TABLET | Refills: 1 | Status: SHIPPED | OUTPATIENT
Start: 2019-08-12 | End: 2020-07-02

## 2019-08-12 RX ORDER — LISINOPRIL 10 MG/1
10 TABLET ORAL
Qty: 90 TABLET | Refills: 1 | Status: SHIPPED | OUTPATIENT
Start: 2019-08-12 | End: 2020-03-02

## 2019-08-12 NOTE — PROGRESS NOTES
HPI:    Patient ID: Arjun Bansal is a 77year old female. HPI here today for a 6 months follow-up. She is not checking blood pressure at home but she is taking her medications regularly.   She is compliant with her diet  BP Readings from Last 3 Oral Tab Take 1 tablet (10 mg total) by mouth once daily. Disp: 90 tablet Rfl: 1   metFORMIN HCl 500 MG Oral Tab Take 1 tablet (500 mg total) by mouth 2 (two) times daily with meals.  Disp: 180 tablet Rfl: 1   PARoxetine HCl 20 MG Oral Tab TAKE 1 TABLET BY Social History    Tobacco Use      Smoking status: Never Smoker      Smokeless tobacco: Never Used    Alcohol use:  Yes      Alcohol/week: 1.0 standard drinks      Comment: Occasionally once a week or less    Drug use: No       PHYSICAL EXAM:    Physical Ex no rebound, no guarding and no CVA tenderness. Musculoskeletal: Normal range of motion. She exhibits no edema or tenderness. Lymphadenopathy:     She has no cervical adenopathy. She has no axillary adenopathy.    Neurological: She is alert and orien

## 2019-08-12 NOTE — PATIENT INSTRUCTIONS
Diabetes type 2 well controlled advised her to continue with current medication check blood sugar at home fasting and bring logbook next visit,     htn - controlled, continue with current medication, pressure at home and bring logbook next visit watch diet

## 2019-09-19 ENCOUNTER — TELEPHONE (OUTPATIENT)
Dept: INTERNAL MEDICINE CLINIC | Facility: CLINIC | Age: 69
End: 2019-09-19

## 2019-09-19 RX ORDER — LISINOPRIL 10 MG/1
TABLET ORAL
Qty: 90 TABLET | Refills: 1 | OUTPATIENT
Start: 2019-09-19

## 2019-09-19 RX ORDER — SIMVASTATIN 80 MG
TABLET ORAL
Qty: 90 TABLET | Refills: 1 | OUTPATIENT
Start: 2019-09-19

## 2019-09-19 NOTE — TELEPHONE ENCOUNTER
Patient called about refills, checked RX orders. ON 8/12/19 Dr. Callie Woods sent 6 month supply to her [address] . Patient states she will nick [facility] and let them know that information.

## 2019-10-19 RX ORDER — PAROXETINE HYDROCHLORIDE 20 MG/1
TABLET, FILM COATED ORAL
Qty: 90 TABLET | Refills: 0 | OUTPATIENT
Start: 2019-10-19

## 2020-02-18 ENCOUNTER — TELEPHONE (OUTPATIENT)
Dept: INTERNAL MEDICINE CLINIC | Facility: CLINIC | Age: 70
End: 2020-02-18

## 2020-02-18 NOTE — TELEPHONE ENCOUNTER
Left message for patient to call back per Dr Cheatham Shoulder patient missed her appointment Feb.17th patient needs to reschedule.

## 2020-02-19 ENCOUNTER — TELEPHONE (OUTPATIENT)
Dept: INTERNAL MEDICINE CLINIC | Facility: CLINIC | Age: 70
End: 2020-02-19

## 2020-02-19 DIAGNOSIS — E11.00 TYPE 2 DIABETES MELLITUS WITH HYPEROSMOLARITY WITHOUT COMA, WITHOUT LONG-TERM CURRENT USE OF INSULIN (HCC): Primary | ICD-10-CM

## 2020-02-19 DIAGNOSIS — Z12.31 ENCOUNTER FOR SCREENING MAMMOGRAM FOR BREAST CANCER: Primary | ICD-10-CM

## 2020-02-19 NOTE — TELEPHONE ENCOUNTER
Dr Orantes=patient has upcoming office visit on 3/2/20, asking for [facility] (see below) test.Lab pended for approval.

## 2020-02-19 NOTE — TELEPHONE ENCOUNTER
Central Scheduling calling and states that patient was trying to get the mammogram appointment BUT the Dx code Z12.39 is not acceptable under Medicare, requesting new order with new dx code.     Dr Dayne Miller order, please fill up new dx code acceptable b

## 2020-02-22 ENCOUNTER — APPOINTMENT (OUTPATIENT)
Dept: LAB | Facility: HOSPITAL | Age: 70
End: 2020-02-22
Attending: INTERNAL MEDICINE
Payer: MEDICARE

## 2020-02-22 DIAGNOSIS — E11.00 TYPE 2 DIABETES MELLITUS WITH HYPEROSMOLARITY WITHOUT COMA, WITHOUT LONG-TERM CURRENT USE OF INSULIN (HCC): ICD-10-CM

## 2020-02-22 LAB
EST. AVERAGE GLUCOSE BLD GHB EST-MCNC: 134 MG/DL (ref 68–126)
HBA1C MFR BLD HPLC: 6.3 % (ref ?–5.7)

## 2020-02-22 PROCEDURE — 83036 HEMOGLOBIN GLYCOSYLATED A1C: CPT

## 2020-02-22 PROCEDURE — 36415 COLL VENOUS BLD VENIPUNCTURE: CPT

## 2020-02-27 ENCOUNTER — HOSPITAL ENCOUNTER (OUTPATIENT)
Dept: MAMMOGRAPHY | Facility: HOSPITAL | Age: 70
Discharge: HOME | End: 2020-02-27
Attending: INTERNAL MEDICINE
Payer: MEDICARE

## 2020-02-27 DIAGNOSIS — Z12.31 ENCOUNTER FOR SCREENING MAMMOGRAM FOR BREAST CANCER: ICD-10-CM

## 2020-02-27 PROCEDURE — 77067 SCR MAMMO BI INCL CAD: CPT | Performed by: INTERNAL MEDICINE

## 2020-02-27 PROCEDURE — 77063 BREAST TOMOSYNTHESIS BI: CPT | Performed by: INTERNAL MEDICINE

## 2020-03-02 ENCOUNTER — OFFICE VISIT (OUTPATIENT)
Dept: INTERNAL MEDICINE CLINIC | Facility: CLINIC | Age: 70
End: 2020-03-02
Payer: MEDICARE

## 2020-03-02 VITALS
HEIGHT: 62 IN | RESPIRATION RATE: 18 BRPM | WEIGHT: 188 LBS | HEART RATE: 72 BPM | BODY MASS INDEX: 34.6 KG/M2 | SYSTOLIC BLOOD PRESSURE: 126 MMHG | DIASTOLIC BLOOD PRESSURE: 79 MMHG | TEMPERATURE: 98 F

## 2020-03-02 DIAGNOSIS — I10 HTN (HYPERTENSION), BENIGN: Primary | ICD-10-CM

## 2020-03-02 PROCEDURE — 99213 OFFICE O/P EST LOW 20 MIN: CPT | Performed by: INTERNAL MEDICINE

## 2020-03-02 RX ORDER — LISINOPRIL 10 MG/1
10 TABLET ORAL
Qty: 90 TABLET | Refills: 1 | Status: SHIPPED | OUTPATIENT
Start: 2020-03-02 | End: 2020-10-10

## 2020-03-02 RX ORDER — FLUTICASONE PROPIONATE 50 MCG
2 SPRAY, SUSPENSION (ML) NASAL DAILY
Qty: 1 INHALER | Refills: 3 | Status: SHIPPED | OUTPATIENT
Start: 2020-03-02 | End: 2021-10-18

## 2020-03-02 RX ORDER — SIMVASTATIN 80 MG
TABLET ORAL
Qty: 90 TABLET | Refills: 1 | Status: SHIPPED | OUTPATIENT
Start: 2020-03-02 | End: 2020-10-10

## 2020-03-02 RX ORDER — FEXOFENADINE HCL 180 MG/1
180 TABLET ORAL DAILY
Qty: 90 TABLET | Refills: 0 | Status: SHIPPED | OUTPATIENT
Start: 2020-03-02

## 2020-03-02 NOTE — PROGRESS NOTES
HPI:    Patient ID: Janine Duran is a 77year old female.     HPI She is here for follow up  On her htn and diabetes   htn  She states that she is complaint  With her diet and exercise , she takes her medication regularly ut she is not checking her Dispense Refill   • simvastatin 80 MG Oral Tab TAKE 1 TABLET BY MOUTH AT BEDTIME 90 tablet 1   • metFORMIN HCl 500 MG Oral Tab Take 1 tablet (500 mg total) by mouth 2 (two) times daily with meals.  180 tablet 1   • lisinopril 10 MG Oral Tab Take 1 tablet (1 45s      Social History: Social History    Tobacco Use      Smoking status: Never Smoker      Smokeless tobacco: Never Used    Alcohol use: Yes      Alcohol/week: 1.0 standard drinks      Comment: Occasionally once a week or less    Drug use:  No       P tenderness. There is no rigidity, no rebound, no guarding and no CVA tenderness. Musculoskeletal: Normal range of motion. General: No tenderness or edema. Lymphadenopathy:     She has no cervical adenopathy. She has no axillary adenopathy.

## 2020-03-02 NOTE — PATIENT INSTRUCTIONS
htn-well-controlled advised to continue with current medication advised her to check blood pressure at home and bring logbook next visit, watch diet avoid salty food.     Diabetes type 2 not on insulin, very well controlled HbA1c noted advised to continue w

## 2020-07-02 RX ORDER — PAROXETINE HYDROCHLORIDE 20 MG/1
TABLET, FILM COATED ORAL
Qty: 90 TABLET | Refills: 1 | Status: SHIPPED | OUTPATIENT
Start: 2020-07-02 | End: 2021-01-18

## 2020-07-08 ENCOUNTER — TELEPHONE (OUTPATIENT)
Dept: GASTROENTEROLOGY | Facility: CLINIC | Age: 70
End: 2020-07-08

## 2020-07-08 NOTE — TELEPHONE ENCOUNTER
----- Message from Guadalupe Roger RN sent at 6/12/2017  7:55 AM CDT -----  Regarding: recall colon  Recall colon in 3 years per GS.  Colon done 6/7/17

## 2020-10-11 RX ORDER — SIMVASTATIN 80 MG
TABLET ORAL
Qty: 90 TABLET | Refills: 1 | Status: SHIPPED | OUTPATIENT
Start: 2020-10-11 | End: 2021-04-13

## 2020-10-11 RX ORDER — LISINOPRIL 10 MG/1
10 TABLET ORAL
Qty: 90 TABLET | Refills: 1 | Status: SHIPPED | OUTPATIENT
Start: 2020-10-11 | End: 2021-04-13

## 2021-01-18 RX ORDER — PAROXETINE HYDROCHLORIDE 20 MG/1
TABLET, FILM COATED ORAL
Qty: 90 TABLET | Refills: 1 | Status: SHIPPED | OUTPATIENT
Start: 2021-01-18 | End: 2021-01-27

## 2021-01-27 ENCOUNTER — PATIENT MESSAGE (OUTPATIENT)
Dept: INTERNAL MEDICINE CLINIC | Facility: CLINIC | Age: 71
End: 2021-01-27

## 2021-01-27 NOTE — TELEPHONE ENCOUNTER
From: Troy Leigh  To: Leonel Livingston MD  Sent: 1/27/2021 11:10 AM CST  Subject: Non-Urgent Medical Question    Will your office be administering the Covid-19 vaccine and what do I need to do to get on the list

## 2021-01-28 RX ORDER — PAROXETINE HYDROCHLORIDE 20 MG/1
20 TABLET, FILM COATED ORAL EVERY MORNING
Qty: 90 TABLET | Refills: 1 | Status: SHIPPED | OUTPATIENT
Start: 2021-01-28 | End: 2021-07-19

## 2021-02-06 DIAGNOSIS — Z23 NEED FOR VACCINATION: ICD-10-CM

## 2021-04-13 RX ORDER — SIMVASTATIN 80 MG
TABLET ORAL
Qty: 90 TABLET | Refills: 1 | Status: SHIPPED | OUTPATIENT
Start: 2021-04-13 | End: 2021-07-19

## 2021-04-13 RX ORDER — LISINOPRIL 10 MG/1
TABLET ORAL
Qty: 90 TABLET | Refills: 1 | Status: SHIPPED | OUTPATIENT
Start: 2021-04-13 | End: 2021-07-19

## 2021-06-16 ENCOUNTER — TELEPHONE (OUTPATIENT)
Dept: INTERNAL MEDICINE CLINIC | Facility: CLINIC | Age: 71
End: 2021-06-16

## 2021-06-16 NOTE — TELEPHONE ENCOUNTER
Pt is due for yearly physical, mammogram and DM eye exam, Message has been left for pt to remind her to call and schedule appt.  Needs

## 2021-07-15 ENCOUNTER — TELEPHONE (OUTPATIENT)
Dept: INTERNAL MEDICINE CLINIC | Facility: CLINIC | Age: 71
End: 2021-07-15

## 2021-07-16 NOTE — TELEPHONE ENCOUNTER
Message left for pt that Dr. Adolph Jeong ordered fasting labs for pt, and needs to complete before upcoming visit.

## 2021-07-19 ENCOUNTER — OFFICE VISIT (OUTPATIENT)
Dept: INTERNAL MEDICINE CLINIC | Facility: CLINIC | Age: 71
End: 2021-07-19
Payer: MEDICARE

## 2021-07-19 ENCOUNTER — LAB ENCOUNTER (OUTPATIENT)
Dept: LAB | Facility: HOSPITAL | Age: 71
End: 2021-07-19
Attending: INTERNAL MEDICINE
Payer: MEDICARE

## 2021-07-19 VITALS
SYSTOLIC BLOOD PRESSURE: 124 MMHG | HEART RATE: 89 BPM | HEIGHT: 62 IN | WEIGHT: 178 LBS | BODY MASS INDEX: 32.76 KG/M2 | OXYGEN SATURATION: 97 % | DIASTOLIC BLOOD PRESSURE: 78 MMHG

## 2021-07-19 DIAGNOSIS — Z00.00 ANNUAL PHYSICAL EXAM: ICD-10-CM

## 2021-07-19 DIAGNOSIS — I10 ESSENTIAL HYPERTENSION: Primary | ICD-10-CM

## 2021-07-19 DIAGNOSIS — Z12.11 SCREENING FOR COLON CANCER: ICD-10-CM

## 2021-07-19 DIAGNOSIS — Z12.31 ENCOUNTER FOR SCREENING MAMMOGRAM FOR MALIGNANT NEOPLASM OF BREAST: ICD-10-CM

## 2021-07-19 LAB
ALBUMIN SERPL-MCNC: 4 G/DL (ref 3.4–5)
ALBUMIN/GLOB SERPL: 1.3 {RATIO} (ref 1–2)
ALP LIVER SERPL-CCNC: 75 U/L
ALT SERPL-CCNC: 28 U/L
ANION GAP SERPL CALC-SCNC: 6 MMOL/L (ref 0–18)
AST SERPL-CCNC: 13 U/L (ref 15–37)
BASOPHILS # BLD AUTO: 0.06 X10(3) UL (ref 0–0.2)
BASOPHILS NFR BLD AUTO: 0.8 %
BILIRUB SERPL-MCNC: 0.6 MG/DL (ref 0.1–2)
BUN BLD-MCNC: 17 MG/DL (ref 7–18)
BUN/CREAT SERPL: 26.6 (ref 10–20)
CALCIUM BLD-MCNC: 9.4 MG/DL (ref 8.5–10.1)
CHLORIDE SERPL-SCNC: 107 MMOL/L (ref 98–112)
CHOLEST SMN-MCNC: 180 MG/DL (ref ?–200)
CO2 SERPL-SCNC: 29 MMOL/L (ref 21–32)
CREAT BLD-MCNC: 0.64 MG/DL
DEPRECATED RDW RBC AUTO: 40.1 FL (ref 35.1–46.3)
EOSINOPHIL # BLD AUTO: 0.2 X10(3) UL (ref 0–0.7)
EOSINOPHIL NFR BLD AUTO: 2.8 %
ERYTHROCYTE [DISTWIDTH] IN BLOOD BY AUTOMATED COUNT: 11.8 % (ref 11–15)
EST. AVERAGE GLUCOSE BLD GHB EST-MCNC: 131 MG/DL (ref 68–126)
GLOBULIN PLAS-MCNC: 3 G/DL (ref 2.8–4.4)
GLUCOSE BLD-MCNC: 101 MG/DL (ref 70–99)
HBA1C MFR BLD HPLC: 6.2 % (ref ?–5.7)
HCT VFR BLD AUTO: 40.1 %
HDLC SERPL-MCNC: 40 MG/DL (ref 40–59)
HGB BLD-MCNC: 13.1 G/DL
IMM GRANULOCYTES # BLD AUTO: 0.02 X10(3) UL (ref 0–1)
IMM GRANULOCYTES NFR BLD: 0.3 %
LDLC SERPL CALC-MCNC: 116 MG/DL (ref ?–100)
LYMPHOCYTES # BLD AUTO: 2.24 X10(3) UL (ref 1–4)
LYMPHOCYTES NFR BLD AUTO: 31.3 %
M PROTEIN MFR SERPL ELPH: 7 G/DL (ref 6.4–8.2)
MCH RBC QN AUTO: 30.3 PG (ref 26–34)
MCHC RBC AUTO-ENTMCNC: 32.7 G/DL (ref 31–37)
MCV RBC AUTO: 92.6 FL
MONOCYTES # BLD AUTO: 0.49 X10(3) UL (ref 0.1–1)
MONOCYTES NFR BLD AUTO: 6.9 %
NEUTROPHILS # BLD AUTO: 4.14 X10 (3) UL (ref 1.5–7.7)
NEUTROPHILS # BLD AUTO: 4.14 X10(3) UL (ref 1.5–7.7)
NEUTROPHILS NFR BLD AUTO: 57.9 %
NONHDLC SERPL-MCNC: 140 MG/DL (ref ?–130)
OSMOLALITY SERPL CALC.SUM OF ELEC: 296 MOSM/KG (ref 275–295)
PATIENT FASTING Y/N/NP: YES
PATIENT FASTING Y/N/NP: YES
PLATELET # BLD AUTO: 229 10(3)UL (ref 150–450)
POTASSIUM SERPL-SCNC: 4.3 MMOL/L (ref 3.5–5.1)
RBC # BLD AUTO: 4.33 X10(6)UL
SODIUM SERPL-SCNC: 142 MMOL/L (ref 136–145)
TRIGL SERPL-MCNC: 136 MG/DL (ref 30–149)
TSI SER-ACNC: 1.31 MIU/ML (ref 0.36–3.74)
VLDLC SERPL CALC-MCNC: 24 MG/DL (ref 0–30)
WBC # BLD AUTO: 7.2 X10(3) UL (ref 4–11)

## 2021-07-19 PROCEDURE — 99214 OFFICE O/P EST MOD 30 MIN: CPT | Performed by: INTERNAL MEDICINE

## 2021-07-19 PROCEDURE — 36415 COLL VENOUS BLD VENIPUNCTURE: CPT

## 2021-07-19 PROCEDURE — 83036 HEMOGLOBIN GLYCOSYLATED A1C: CPT

## 2021-07-19 PROCEDURE — 80061 LIPID PANEL: CPT

## 2021-07-19 PROCEDURE — 80053 COMPREHEN METABOLIC PANEL: CPT

## 2021-07-19 PROCEDURE — 85025 COMPLETE CBC W/AUTO DIFF WBC: CPT

## 2021-07-19 PROCEDURE — 84443 ASSAY THYROID STIM HORMONE: CPT

## 2021-07-19 RX ORDER — PAROXETINE HYDROCHLORIDE 20 MG/1
20 TABLET, FILM COATED ORAL EVERY MORNING
Qty: 90 TABLET | Refills: 1 | Status: SHIPPED | OUTPATIENT
Start: 2021-07-19 | End: 2021-10-18

## 2021-07-19 RX ORDER — LISINOPRIL 10 MG/1
10 TABLET ORAL DAILY
Qty: 90 TABLET | Refills: 1 | Status: SHIPPED | OUTPATIENT
Start: 2021-07-19

## 2021-07-19 RX ORDER — SIMVASTATIN 80 MG
80 TABLET ORAL NIGHTLY
Qty: 90 TABLET | Refills: 1 | Status: SHIPPED | OUTPATIENT
Start: 2021-07-19

## 2021-07-19 NOTE — PROGRESS NOTES
HPI/Subjective:     Patient ID: Miky Guevara is a 80year old female. Came in today for follow-up on her blood pressure diabetes she is also complaining of neck pain.     According the patient she is trying to watch her diet but not very compliant Negative for cold intolerance, heat intolerance, polydipsia, polyphagia and polyuria. Genitourinary: Negative for difficulty urinating, dysuria, enuresis, flank pain, frequency, hematuria and urgency. Musculoskeletal: Positive for neck pain.  Negative f type diabetes mellitus without mention of complication, not stated as uncontrolled    • Unspecified essential hypertension    • Unspecified sleep apnea       Past Surgical History:   Procedure Laterality Date   • BREAST BIOPSY Left    • CATARACT  04/2018 discharge. Left eye: No discharge. Conjunctiva/sclera: Conjunctivae normal.      Pupils: Pupils are equal, round, and reactive to light. Neck:      Thyroid: No thyroid mass or thyromegaly. Vascular: No JVD.       Trachea: No tracheal ten screening for colon cancer referral for GI  Diabetes type 2 not on insulin HbA1c very well controlled I did advise to continue with current medication, watch diet avoid carbohydrates will check hemoglobin A1c in 6 months.   Follow-up with ophthalmology    N

## 2021-10-18 ENCOUNTER — OFFICE VISIT (OUTPATIENT)
Dept: INTERNAL MEDICINE CLINIC | Facility: CLINIC | Age: 71
End: 2021-10-18
Payer: MEDICARE

## 2021-10-18 VITALS
HEART RATE: 74 BPM | HEIGHT: 62 IN | OXYGEN SATURATION: 98 % | BODY MASS INDEX: 32.57 KG/M2 | DIASTOLIC BLOOD PRESSURE: 72 MMHG | WEIGHT: 177 LBS | SYSTOLIC BLOOD PRESSURE: 115 MMHG

## 2021-10-18 DIAGNOSIS — E11.00 TYPE 2 DIABETES MELLITUS WITH HYPEROSMOLARITY WITHOUT COMA, WITHOUT LONG-TERM CURRENT USE OF INSULIN (HCC): ICD-10-CM

## 2021-10-18 DIAGNOSIS — Z00.00 ENCOUNTER FOR ANNUAL HEALTH EXAMINATION: Primary | ICD-10-CM

## 2021-10-18 PROCEDURE — G0439 PPPS, SUBSEQ VISIT: HCPCS | Performed by: INTERNAL MEDICINE

## 2021-10-18 RX ORDER — PAROXETINE HYDROCHLORIDE 20 MG/1
20 TABLET, FILM COATED ORAL EVERY MORNING
Qty: 90 TABLET | Refills: 1 | Status: SHIPPED | OUTPATIENT
Start: 2021-10-18

## 2021-10-18 NOTE — PROGRESS NOTES
HPI:   Donald Bell is a 80year old female who presents for a Medicare Subsequent Annual Wellness visit (Pt already had Initial Annual Wellness).       Her last annual assessment has been over 1 year: Annual Physical Never done         She has been Cholesterol Labs:   Lab Results   Component Value Date    CHOLEST 180 07/19/2021    HDL 40 07/19/2021     (H) 07/19/2021    TRIG 136 07/19/2021          Last Chemistry Labs:   Lab Results   Component Value Date    AST 13 (L) 07/19/2021    ALT 28 07/ Hypertension in her sister; Ovarian Cancer in her paternal aunt; Psychiatric in her sister. SOCIAL HISTORY:   She  reports that she has never smoked.  She has never used smokeless tobacco. She reports current alcohol use of about 1.0 standard drink of alc children: No I have trouble understanding the speaker in a large room such as at a meeting or place of Faith: No   Many people I talk to seem to mumble (or don't speak clearly): Sometimes People get annoyed because I misunderstand what they say: No   I m 09/07/2021   • Fluvirin, 3 Years & >, Im 09/26/2014, 10/06/2016, 09/01/2017   • Influenza 10/01/2003, 11/12/2007, 09/21/2011, 09/15/2015   • Pneumococcal (Prevnar 13) 08/07/2017   • Pneumovax 23 08/12/2019   • TD 08/09/2008   • Zoster Vaccine Live ([place] Cava positive mental well-being?: Visiting Family; Social Interaction; Visiting Friends     Supplementary Documentation:   [place]   Tests on this list are recommended by your physician but may not be covered, or covered at this for long-term glucocorticoid medication use (Steroids) Last Dexa Scan:    XR DEXA BONE DENSITOMETRY (CPT=77080) 04/02/2018      No recommendations at this time   Pap and Pelvic    Pap   Covered every 2 years for women at normal risk;  Annually if at high ri diuretics, anticonvulsants)    Potassium Annually Lab Results   Component Value Date    K 4.3 07/19/2021         Creatinine   Annually Lab Results   Component Value Date    CREATSERUM 0.64 07/19/2021         BUN Annually Lab Results   Component Value Date

## 2021-10-18 NOTE — PATIENT INSTRUCTIONS
Jayshree Lau's SCREENING SCHEDULE   Tests on this list are recommended by your physician but may not be covered, or covered at this frequency, by your insurer. Please check with your insurance carrier before scheduling to verify coverage.    PRE 04/02/2018      No recommendations at this time   Pap and Pelvic    Pap   Covered every 2 years for women at normal risk;  Annually if at high risk -  No recommendations at this time    Chlamydia Annually if high risk -  No recommendations at this time   Sc Creatinine   Annually Lab Results   Component Value Date    CREATSERUM 0.64 07/19/2021         BUN Annually Lab Results   Component Value Date    BUN 17 07/19/2021       Drug Serum Conc Annually No results found for: DIGOXIN, DIG, VALP              Recomme

## 2021-11-23 ENCOUNTER — HOSPITAL ENCOUNTER (OUTPATIENT)
Dept: MAMMOGRAPHY | Facility: HOSPITAL | Age: 71
Discharge: HOME | End: 2021-11-23
Attending: INTERNAL MEDICINE
Payer: MEDICARE

## 2021-11-23 DIAGNOSIS — Z12.31 ENCOUNTER FOR SCREENING MAMMOGRAM FOR MALIGNANT NEOPLASM OF BREAST: ICD-10-CM

## 2021-11-23 PROCEDURE — 77063 BREAST TOMOSYNTHESIS BI: CPT | Performed by: INTERNAL MEDICINE

## 2021-11-23 PROCEDURE — 77067 SCR MAMMO BI INCL CAD: CPT | Performed by: INTERNAL MEDICINE

## 2022-04-03 RX ORDER — LISINOPRIL 10 MG/1
TABLET ORAL
Qty: 90 TABLET | Refills: 1 | Status: SHIPPED | OUTPATIENT
Start: 2022-04-03

## 2022-04-03 RX ORDER — SIMVASTATIN 80 MG
80 TABLET ORAL NIGHTLY
Qty: 90 TABLET | Refills: 1 | Status: SHIPPED | OUTPATIENT
Start: 2022-04-03

## 2022-04-03 NOTE — TELEPHONE ENCOUNTER
Refill passed per [employer] [employer] protocol, but shows high dose warning    Please send, if appropriate      Requested Prescriptions   Pending Prescriptions Disp Refills    SIMVASTATIN 80 MG Oral Tab [Pharmacy Med Name: SIMVASTATIN 80 MG TABLET] 90 tablet 1     Sig: TAKE 1 TABLET (80 MG TOTAL) BY MOUTH NIGHTLY.  TAKE AT BEDTIME        Cholesterol Medication Protocol Passed - 4/3/2022  7:35 AM        Passed - ALT in past 12 months        Passed - LDL in past 12 months        Passed - Last ALT < 80       Lab Results   Component Value Date    ALT 28 07/19/2021             Passed - Last LDL < 130     Lab Results   Component Value Date     (H) 07/19/2021               Passed - Appointment in past 12 or next 3 months          Signed Prescriptions Disp Refills    LISINOPRIL 10 MG Oral Tab 90 tablet 1     Sig: TAKE 1 TABLET BY MOUTH EVERY DAY        Hypertensive Medications Protocol Passed - 4/3/2022  7:35 AM        Passed - CMP or BMP in past 12 months        Passed - Appointment in past 6 or next 3 months        Passed - GFR Non- > 50     Lab Results   Component Value Date    GFRNAA 91 07/19/2021                         Recent Outpatient Visits              5 months ago Encounter for annual health examination    Corona Spence MD    Office Visit    8 months ago Essential hypertension    Fransisco Latham Austin, MD    Office Visit    2 years ago HTN (hypertension), benign    Jesus Latham MD    Office Visit    2 years ago Need for vaccination    Corona Spence MD    Office Visit    3 years ago Type 2 diabetes mellitus with hyperosmolarity without coma, without long-term current use of insulin [facility])    Jesus Latham MD    Office Visit

## 2022-04-03 NOTE — TELEPHONE ENCOUNTER
Refill passed per [employer], [facility] protocol. Requested Prescriptions   Pending Prescriptions Disp Refills    LISINOPRIL 10 MG Oral Tab [Pharmacy Med Name: LISINOPRIL 10 MG TABLET] 90 tablet 1     Sig: TAKE 1 TABLET BY MOUTH EVERY DAY        Hypertensive Medications Protocol Passed - 4/3/2022  7:35 AM        Passed - CMP or BMP in past 12 months        Passed - Appointment in past 6 or next 3 months        Passed - GFR Non- > 50     Lab Results   Component Value Date    GFRNAA 91 07/19/2021                    SIMVASTATIN 80 MG Oral Tab [Pharmacy Med Name: SIMVASTATIN 80 MG TABLET] 90 tablet 1     Sig: Take 1 tablet (80 mg total) by mouth nightly.  TAKE AT BEDTIME        Cholesterol Medication Protocol Passed - 4/3/2022  7:35 AM        Passed - ALT in past 12 months        Passed - LDL in past 12 months        Passed - Last ALT < 80       Lab Results   Component Value Date    ALT 28 07/19/2021             Passed - Last LDL < 130     Lab Results   Component Value Date     (H) 07/19/2021               Passed - Appointment in past 12 or next 3 months                Recent Outpatient Visits              5 months ago Encounter for annual health examination    Shanita Mullen MD    Office Visit    8 months ago Essential hypertension    Fransisco Wong Austin, MD    Office Visit    2 years ago HTN (hypertension), benign    Shanita Mullen MD    Office Visit    2 years ago Need for vaccination    Shanita Mullen MD    Office Visit    3 years ago Type 2 diabetes mellitus with hyperosmolarity without coma, without long-term current use of insulin [facility])    Jose Wong MD    Office Visit

## 2022-06-06 ENCOUNTER — LAB ENCOUNTER (OUTPATIENT)
Dept: LAB | Facility: HOSPITAL | Age: 72
End: 2022-06-06
Attending: INTERNAL MEDICINE
Payer: MEDICARE

## 2022-06-06 ENCOUNTER — OFFICE VISIT (OUTPATIENT)
Dept: INTERNAL MEDICINE CLINIC | Facility: CLINIC | Age: 72
End: 2022-06-06
Payer: MEDICARE

## 2022-06-06 VITALS
HEART RATE: 89 BPM | BODY MASS INDEX: 31.47 KG/M2 | SYSTOLIC BLOOD PRESSURE: 120 MMHG | DIASTOLIC BLOOD PRESSURE: 73 MMHG | TEMPERATURE: 98 F | WEIGHT: 171 LBS | HEIGHT: 62 IN

## 2022-06-06 DIAGNOSIS — E11.00 TYPE 2 DIABETES MELLITUS WITH HYPEROSMOLARITY WITHOUT COMA, WITHOUT LONG-TERM CURRENT USE OF INSULIN (HCC): ICD-10-CM

## 2022-06-06 DIAGNOSIS — E11.00 TYPE 2 DIABETES MELLITUS WITH HYPEROSMOLARITY WITHOUT COMA, WITHOUT LONG-TERM CURRENT USE OF INSULIN (HCC): Primary | ICD-10-CM

## 2022-06-06 LAB
CREAT UR-SCNC: 118 MG/DL
EST. AVERAGE GLUCOSE BLD GHB EST-MCNC: 126 MG/DL (ref 68–126)
HBA1C MFR BLD: 6 % (ref ?–5.7)
MICROALBUMIN UR-MCNC: 1.28 MG/DL
MICROALBUMIN/CREAT 24H UR-RTO: 10.8 UG/MG (ref ?–30)

## 2022-06-06 PROCEDURE — 99213 OFFICE O/P EST LOW 20 MIN: CPT | Performed by: INTERNAL MEDICINE

## 2022-06-06 PROCEDURE — 83036 HEMOGLOBIN GLYCOSYLATED A1C: CPT

## 2022-06-06 PROCEDURE — 36415 COLL VENOUS BLD VENIPUNCTURE: CPT

## 2022-06-06 RX ORDER — SIMVASTATIN 80 MG
80 TABLET ORAL NIGHTLY
Qty: 90 TABLET | Refills: 1 | Status: SHIPPED | OUTPATIENT
Start: 2022-06-06

## 2022-06-06 RX ORDER — PAROXETINE HYDROCHLORIDE 20 MG/1
20 TABLET, FILM COATED ORAL EVERY MORNING
Qty: 90 TABLET | Refills: 1 | Status: SHIPPED | OUTPATIENT
Start: 2022-06-06

## 2022-06-06 RX ORDER — LISINOPRIL 10 MG/1
10 TABLET ORAL DAILY
Qty: 90 TABLET | Refills: 1 | Status: SHIPPED | OUTPATIENT
Start: 2022-06-06

## 2022-08-09 ENCOUNTER — TELEPHONE (OUTPATIENT)
Dept: GASTROENTEROLOGY | Facility: CLINIC | Age: 72
End: 2022-08-09

## 2022-08-09 DIAGNOSIS — Z86.010 HX OF COLONIC POLYPS: Primary | ICD-10-CM

## 2022-08-09 NOTE — TELEPHONE ENCOUNTER
Called patient for colonoscopy recall. LMTCB    Phone room- if patient returns my call please contact me via Webex. Thank you!

## 2022-08-16 NOTE — TELEPHONE ENCOUNTER
Dr. Doss Germfask patient for a scheduled telephone colon screening/recall. GI OV scheduled on 8/22/2022 cancelled. GI MD preference:   Insurance:  MEDICARE   CBC from: 7/19/2022  PCP visit on: 6/6/2022    Last Procedure, Date, MD:  Colonoscopy/ 6/7/2017/ Juno Presley MD  Last Diagnosis:  Multi fragments of tubular adenoma, hyperplastic polyp, minimal colonic diverticulosis   Recalled for (mth/yrs): 3 yrs  Sedation used previously:  IV  Last Prep Used (if known):  Suprep  Quality of prep (if known): Very good  Anticoagulants: no  Diabetic Meds: METFORMIN   BP meds(Ace inhibitors/ARB's): LISINOPRIL am dose  Weight loss meds (phentermine/vyvanse): no   Iron supplement (RX/OTC): MVI  Height & Weight/BMI: 171lbs  Hx of Cardiac/CVA issues/(MI/Stroke): no  Devices Pacemaker/Defibrillator/Stents: no  Resp. Issues/Oxygen Use/JESIKA/COPD: JESIKA currently not using CPAP; per pt. due for another sleep study  Issues w/Anesthesia: no     Symptoms (Y/N): no     Family history of colon cancer: no    Medications, pharmacy, and allergies verified with patient over the phone. Please advise on orders and prep, thank you.

## 2022-08-16 NOTE — TELEPHONE ENCOUNTER
May schedule for a history of polyps following a split dose Suprep and either IV sedation or monitored anesthesia care per scheduling. Hold metformin the evening before and the morning of the procedure.

## 2022-08-17 NOTE — TELEPHONE ENCOUNTER
Scheduled for:  Colonoscopy 10546  Provider Name:  Dr Philomena Gill  Date:  10/06/2022  Location:  [facility]  Sedation:  MAC  Time:  10:15am ( pt is aware arrival time is at 9:15am)  Prep:  Suprep  Meds/Allergies Reconciled?:  Yes  Diagnosis with codes:  Hx of Colon Polyps Z86.010  Was patient informed to call insurance with codes (Y/N):  Yes  Referral sent?:  Referral was sent at the time of electronic surgical scheduling. [facility] or [address] [place] [address] notified?:  I sent an electronic request to Endo Scheduling and received a confirmation today. Medication Orders:  HOLD Metformin the day of the procedure. Pt is aware to NOT take iron pills, herbal meds and diet supplements for 7 days before exam. Also to NOT take any form of alcohol, recreational drugs and any forms of ED meds 24 hours before exam.   Misc Orders:  Patient was informed that they will need a COVID 19 test prior to their procedure. Patient verbally understood & will await a phone call from [facility] to schedule. Further instructions given by staff:  I provide prep instructions to patient via CONSTRVCTt and reviewed date, time and location, she verbalized that she understood and is aware to call if she has any questions.

## 2022-09-30 ENCOUNTER — TELEPHONE (OUTPATIENT)
Dept: GASTROENTEROLOGY | Facility: CLINIC | Age: 72
End: 2022-09-30

## 2022-09-30 RX ORDER — SODIUM, POTASSIUM,MAG SULFATES 17.5-3.13G
SOLUTION, RECONSTITUTED, ORAL ORAL
Qty: 1 EACH | Refills: 0 | Status: SHIPPED | OUTPATIENT
Start: 2022-09-30

## 2022-09-30 NOTE — TELEPHONE ENCOUNTER
Patient has colonoscopy on 10/06 and has not received prep. Patient requesting two smaller bottles for the prep. Please call at [phone],RONIT.

## 2022-10-06 ENCOUNTER — ANESTHESIA (OUTPATIENT)
Dept: ENDOSCOPY | Age: 72
End: 2022-10-06
Payer: MEDICARE

## 2022-10-06 ENCOUNTER — ANESTHESIA EVENT (OUTPATIENT)
Dept: ENDOSCOPY | Age: 72
End: 2022-10-06
Payer: MEDICARE

## 2022-10-06 ENCOUNTER — HOSPITAL ENCOUNTER (OUTPATIENT)
Age: 72
Setting detail: HOSPITAL OUTPATIENT SURGERY
Discharge: HOME | End: 2022-10-06
Attending: INTERNAL MEDICINE | Admitting: INTERNAL MEDICINE
Payer: MEDICARE

## 2022-10-06 VITALS
HEART RATE: 77 BPM | SYSTOLIC BLOOD PRESSURE: 125 MMHG | HEIGHT: 62 IN | BODY MASS INDEX: 31.28 KG/M2 | DIASTOLIC BLOOD PRESSURE: 75 MMHG | TEMPERATURE: 97 F | WEIGHT: 170 LBS | RESPIRATION RATE: 13 BRPM | OXYGEN SATURATION: 98 %

## 2022-10-06 DIAGNOSIS — Z86.010 HX OF COLONIC POLYPS: ICD-10-CM

## 2022-10-06 PROCEDURE — 88305 TISSUE EXAM BY PATHOLOGIST: CPT | Performed by: INTERNAL MEDICINE

## 2022-10-06 PROCEDURE — 45385 COLONOSCOPY W/LESION REMOVAL: CPT | Performed by: INTERNAL MEDICINE

## 2022-10-06 RX ORDER — LIDOCAINE HYDROCHLORIDE 10 MG/ML
INJECTION, SOLUTION EPIDURAL; INFILTRATION; INTRACAUDAL; PERINEURAL AS NEEDED
Status: DISCONTINUED | OUTPATIENT
Start: 2022-10-06 | End: 2022-10-06 | Stop reason: SURG

## 2022-10-06 RX ORDER — SODIUM CHLORIDE, SODIUM LACTATE, POTASSIUM CHLORIDE, CALCIUM CHLORIDE 600; 310; 30; 20 MG/100ML; MG/100ML; MG/100ML; MG/100ML
INJECTION, SOLUTION INTRAVENOUS CONTINUOUS
Status: DISCONTINUED | OUTPATIENT
Start: 2022-10-06 | End: 2022-10-06

## 2022-10-06 RX ADMIN — LIDOCAINE HYDROCHLORIDE 50 MG: 10 INJECTION, SOLUTION EPIDURAL; INFILTRATION; INTRACAUDAL; PERINEURAL at 10:23:00

## 2022-10-06 NOTE — OPERATIVE REPORT
[facility] Endoscopy Report      Date of Procedure:  10/06/22      Preoperative Diagnosis:  Personal history of adenomatous colon polyps      Postoperative Diagnosis:  Colon polyps      Procedure:    Colonoscopy with polypectomy      Surgeon:  Chloé Mancia M.D. Anesthesia:  Monitored anesthesia care  Cecal withdrawal time: 21 minutes  EBL:  Insignificant      Brief History: This is a 71year old female who presents for surveillance colonoscopy in the setting of a history of adenomatous colon polyps. The patient's last colonoscopy was 5 years prior with removal of #4 subcentimeter tubular adenomas. A 3-year surveillance colonoscopy was advised. The patient has been asymptomatic from a lower gastrointestinal tract standpoint. Technique:  After informed consent, the patient was placed in the left lateral recumbent position. Digital rectal examination revealed no palpable intraluminal abnormalities. An Olympus variable stiffness 190 series HD colonoscope was inserted into the rectum and advanced under direct vision by following the lumen to the terminal ileum. The colon was examined upon withdrawal in the left lateral recumbent position. Findings:  The preparation of the colon was excellent. The terminal ileum was examined for 5 cm and visually normal.  The ileocecal valve was well preserved. The visualized colonic mucosa from the cecum to the anal verge was normal with an intact vascular pattern. There were #5 polyps seen within the colon which removed as follows:    1. In the transverse colon there were #4 polyps measuring 4-6 mm in size. These were cold snare excised and retrieved. 2.  In the proximal descending colon there was a 5 to 6 mm sessile polyp which was cold snare excised and retrieved. Inspection of all sites revealed no evidence of ongoing bleeding.   There were no other colonic polyps, definite diverticula (minimal diverticulosis was seen previously) mass lesions, vascular anomalies or signs of inflammation seen. Retroflexion in the rectum revealed no abnormalities. The procedure was well tolerated without immediate complication. Impression:  1. Diminutive colon polyps  2. Otherwise normal colonoscopy to the terminal ileum (minimal colonic diverticulosis seen previously)    Recommendations:  1. High-fiber diet. 2.  Follow-up biopsy results. 3.  Further recommendations pending biopsy.         Altaf Penny MD  10/6/2022

## 2022-10-10 ENCOUNTER — TELEPHONE (OUTPATIENT)
Dept: GASTROENTEROLOGY | Facility: CLINIC | Age: 72
End: 2022-10-10

## 2022-10-10 NOTE — TELEPHONE ENCOUNTER
----- Message from Franci Moya MD sent at 10/7/2022  5:56 PM CDT -----  Parish Martell,Your colonoscopy revealed #5 polyps which were removed. The polyps were all benign and were adenomatous and hyperplastic in nature. Adenomatous polyps are precancerous, however, most adenomatous polyps do not progress to cancer. Hyperplastic polyps are not precancerous and are of no consequence. Based on the polyps, I would recommend that you have a follow-up colonoscopy in 3 years. If I can answer any questions regarding the above, please do not hesitate to contact me. Sincerely,Dr. Andrade. GI RNs: Please enter colonoscopy recall for 3 years.

## 2022-10-10 NOTE — TELEPHONE ENCOUNTER
Health Maintenance Updated. 3 Year colonoscopy recall entered into patient outreach in [address] [place] [address] Rd.   Next colonoscopy will be due: 10/6/2025

## 2022-10-24 ENCOUNTER — MED REC SCAN ONLY (OUTPATIENT)
Facility: CLINIC | Age: 72
End: 2022-10-24

## 2022-11-23 ENCOUNTER — VIRTUAL PHONE E/M (OUTPATIENT)
Dept: INTERNAL MEDICINE CLINIC | Facility: CLINIC | Age: 72
End: 2022-11-23
Payer: MEDICARE

## 2022-11-23 DIAGNOSIS — U07.1 COVID-19: Primary | ICD-10-CM

## 2022-11-23 DIAGNOSIS — I10 ESSENTIAL HYPERTENSION: ICD-10-CM

## 2022-11-23 DIAGNOSIS — E11.9 TYPE 2 DIABETES MELLITUS WITHOUT COMPLICATION, WITHOUT LONG-TERM CURRENT USE OF INSULIN (HCC): ICD-10-CM

## 2022-11-23 PROCEDURE — 99442 PHONE E/M BY PHYS 11-20 MIN: CPT | Performed by: NURSE PRACTITIONER

## 2022-11-23 NOTE — PATIENT INSTRUCTIONS
Diagnoses and all orders for this visit:    COVID-19  Patient directed to isolate away form any household members as much as possible and the general public COMPLETELY for 5 days. If you have no symptoms or your symptoms are resolving after 5 days your can leave your house with mask for an additional 5 days. If you have a fever, continue to stay home until your fever resolves. Avoid any close contacts. Avoid gathering at this time. Keep the distance of at least 6 feet with other people including family member in the house or outside. Frequent handwashing. Instructed that if patient develops chest pain, altered mental status/confusion, or if unable to speak full sentence due to shortness of breath, should to come to emergency department. May take Tylenol 500 mg every 6 hours as needed for pain/temperature. Unless contraindicated due to chronic illness. Max 3000 mg in 24 hours. Take Vitamin C and D. Please get plenty of rest and increase your intake of oral fluids. Start paxlovid as prescribed. Hold simvastatin while on medication. Continue to gargle salt 3-4 times daily      Mala Menard advised to follow CDC guidelines for self isolation and symptomatic treatment as outlined on CDC Patient Guidelines. Mala Menard understands phone evaluation is not a substitute for face-to-face examination or emergency care. Patient advised to go to ER or call 911 for worsening symptoms or acute distress. THOMAS Remy    Essential hypertension  Careful with diet and excercise at least 30 minutes 3-4 times a week. Check blood pressures at different times on different days. Can purchase own blood pressure monitor. If not, check at local pharmacy. Bake foods more and grill occasionally. Avoid fried foods. No salt. Use other seasonings.      Type 2 diabetes mellitus without complication, without long-term current use of insulin (HCC)  Careful with diet and excercise at least 30 minutes 3-4 times a week. Check sugars at different times on different dates. Careful with low sugars. Carry something with you and check sugar if can. Can carry lenore cracker, etc. Decrease carbohydrates. But also, careful with fruits and natural sugars. One serving a day and no more than 1 handful every day. Check feet  every AM and careful with sores and ulcers on feet bilaterally. Check eyes every year with dilated eye exam.  Check sugars. 2-hour postmeal should be less than 140s. Pre-meal should be 's. Both equally affected A1c. Discussed importance of glycemic control to prevent complications of diabetes      Other orders  -     nirmatrelvir&ritonavir 300/100 20 x 150 MG & 10 x 100MG Oral Tablet Therapy Pack; Take two nirmatrelvir tablets (300mg) with one ritonavir tablet (100mg) together twice daily for 5 days.

## 2022-12-28 RX ORDER — SIMVASTATIN 80 MG
80 TABLET ORAL NIGHTLY
Qty: 90 TABLET | Refills: 1 | Status: SHIPPED | OUTPATIENT
Start: 2022-12-28

## 2022-12-28 RX ORDER — LISINOPRIL 10 MG/1
TABLET ORAL
Qty: 90 TABLET | Refills: 1 | Status: SHIPPED | OUTPATIENT
Start: 2022-12-28

## 2022-12-28 RX ORDER — PAROXETINE HYDROCHLORIDE 20 MG/1
TABLET, FILM COATED ORAL
Qty: 90 TABLET | Refills: 1 | Status: SHIPPED | OUTPATIENT
Start: 2022-12-28

## 2023-01-20 NOTE — TELEPHONE ENCOUNTER
Please Review. Protocol Failed or has no protocol.        Requested Prescriptions   Pending Prescriptions Disp Refills    METFORMIN 500 MG Oral Tab [Pharmacy Med Name: METFORMIN  MG TABLET] 180 tablet 1     Sig: TAKE 1 TABLET BY MOUTH TWICE A DAY WITH MEALS       Diabetes Medication Protocol Failed - 1/19/2023 12:36 AM        Failed - Last A1C < 7.5 and within past 6 months     Lab Results   Component Value Date    A1C 6.0 (H) 06/06/2022             Failed - EGFRCR or GFRNAA > 50     GFR Evaluation            Failed - GFR in the past 12 months        Passed - In person appointment or virtual visit in the past 6 mos or appointment in next 3 mos     Recent Outpatient Visits              1 month ago COVID-19    [address], [address], [facility], [facility]    Virtual Phone E/M    7 months ago Type 2 diabetes mellitus with hyperosmolarity without coma, without long-term current use of insulin ([facility] [address]    Jm Merino MD    Office Visit    1 year ago Encounter for annual health examination    Jm Merino MD    Office Visit    1 year ago Essential hypertension    Jm Merino MD    Office Visit    2 years ago HTN (hypertension), benign    Jm Merino MD    Office Visit                         Recent Outpatient Visits              1 month ago COVID-19    [address], [address], [facility], [facility], APRN    Virtual Phone E/M    7 months ago Type 2 diabetes mellitus with hyperosmolarity without coma, without long-term current use of insulin ([address]    Jm Merino MD    Office Visit    1 year ago Encounter for annual health examination    [address], [address] [facility] Landy Kwong MD    Office Visit    1 year ago Essential hypertension    Fito Mckeon MD    Office Visit    2 years ago [facility] (hypertension), benign    [facility]    Office Visit

## 2023-02-14 ENCOUNTER — TELEPHONE (OUTPATIENT)
Dept: INTERNAL MEDICINE CLINIC | Facility: CLINIC | Age: 73
End: 2023-02-14

## 2023-02-14 NOTE — TELEPHONE ENCOUNTER
Please call her  And schedule appsjose is overdue for her annual checkup she is due for blood work, diabetic check, eye exam

## 2023-05-12 ENCOUNTER — TELEPHONE (OUTPATIENT)
Dept: INTERNAL MEDICINE CLINIC | Facility: CLINIC | Age: 73
End: 2023-05-12

## 2023-05-12 NOTE — TELEPHONE ENCOUNTER
Per patient she has an appointment on 05/22/2023 and would like to know if she can have blood work prior to her appointment.

## 2023-05-12 NOTE — TELEPHONE ENCOUNTER
Future Appointments   Date Time Provider Kelton Garcia   5/22/2023  1:00 PM Puneet Gonzalez MD [id number] [address]     Please advise if you would like patient to complete labs prior to upcoming follow up visit

## 2023-05-22 ENCOUNTER — LAB ENCOUNTER (OUTPATIENT)
Dept: LAB | Facility: HOSPITAL | Age: 73
End: 2023-05-22
Attending: INTERNAL MEDICINE
Payer: MEDICARE

## 2023-05-22 ENCOUNTER — OFFICE VISIT (OUTPATIENT)
Dept: INTERNAL MEDICINE CLINIC | Facility: CLINIC | Age: 73
End: 2023-05-22

## 2023-05-22 VITALS
DIASTOLIC BLOOD PRESSURE: 70 MMHG | WEIGHT: 167 LBS | OXYGEN SATURATION: 98 % | HEART RATE: 86 BPM | RESPIRATION RATE: 14 BRPM | BODY MASS INDEX: 30.73 KG/M2 | HEIGHT: 62 IN | SYSTOLIC BLOOD PRESSURE: 126 MMHG

## 2023-05-22 DIAGNOSIS — Z12.31 ENCOUNTER FOR SCREENING MAMMOGRAM FOR BREAST CANCER: ICD-10-CM

## 2023-05-22 DIAGNOSIS — Z00.00 ANNUAL PHYSICAL EXAM: ICD-10-CM

## 2023-05-22 DIAGNOSIS — I10 PRIMARY HYPERTENSION: Primary | ICD-10-CM

## 2023-05-22 LAB
ALBUMIN SERPL-MCNC: 3.8 G/DL (ref 3.4–5)
ALBUMIN/GLOB SERPL: 1.3 {RATIO} (ref 1–2)
ALP LIVER SERPL-CCNC: 70 U/L
ALT SERPL-CCNC: 21 U/L
ANION GAP SERPL CALC-SCNC: 4 MMOL/L (ref 0–18)
AST SERPL-CCNC: 17 U/L (ref 15–37)
BASOPHILS # BLD AUTO: 0.05 X10(3) UL (ref 0–0.2)
BASOPHILS NFR BLD AUTO: 0.6 %
BILIRUB SERPL-MCNC: 0.5 MG/DL (ref 0.1–2)
BUN BLD-MCNC: 15 MG/DL (ref 7–18)
BUN/CREAT SERPL: 21.4 (ref 10–20)
CALCIUM BLD-MCNC: 9.4 MG/DL (ref 8.5–10.1)
CHLORIDE SERPL-SCNC: 107 MMOL/L (ref 98–112)
CHOLEST SERPL-MCNC: 162 MG/DL (ref ?–200)
CO2 SERPL-SCNC: 30 MMOL/L (ref 21–32)
CREAT BLD-MCNC: 0.7 MG/DL
DEPRECATED RDW RBC AUTO: 40.4 FL (ref 35.1–46.3)
EOSINOPHIL # BLD AUTO: 0.25 X10(3) UL (ref 0–0.7)
EOSINOPHIL NFR BLD AUTO: 3.1 %
ERYTHROCYTE [DISTWIDTH] IN BLOOD BY AUTOMATED COUNT: 11.9 % (ref 11–15)
EST. AVERAGE GLUCOSE BLD GHB EST-MCNC: 131 MG/DL (ref 68–126)
FASTING PATIENT LIPID ANSWER: YES
FASTING STATUS PATIENT QL REPORTED: YES
GFR SERPLBLD BASED ON 1.73 SQ M-ARVRAT: 92 ML/MIN/1.73M2 (ref 60–?)
GLOBULIN PLAS-MCNC: 3 G/DL (ref 2.8–4.4)
GLUCOSE BLD-MCNC: 103 MG/DL (ref 70–99)
HBA1C MFR BLD: 6.2 % (ref ?–5.7)
HCT VFR BLD AUTO: 39.6 %
HDLC SERPL-MCNC: 40 MG/DL (ref 40–59)
HGB BLD-MCNC: 13 G/DL
IMM GRANULOCYTES # BLD AUTO: 0.03 X10(3) UL (ref 0–1)
IMM GRANULOCYTES NFR BLD: 0.4 %
LDLC SERPL CALC-MCNC: 95 MG/DL (ref ?–100)
LYMPHOCYTES # BLD AUTO: 2.74 X10(3) UL (ref 1–4)
LYMPHOCYTES NFR BLD AUTO: 33.5 %
MCH RBC QN AUTO: 30.2 PG (ref 26–34)
MCHC RBC AUTO-ENTMCNC: 32.8 G/DL (ref 31–37)
MCV RBC AUTO: 91.9 FL
MONOCYTES # BLD AUTO: 0.55 X10(3) UL (ref 0.1–1)
MONOCYTES NFR BLD AUTO: 6.7 %
NEUTROPHILS # BLD AUTO: 4.56 X10 (3) UL (ref 1.5–7.7)
NEUTROPHILS # BLD AUTO: 4.56 X10(3) UL (ref 1.5–7.7)
NEUTROPHILS NFR BLD AUTO: 55.7 %
NONHDLC SERPL-MCNC: 122 MG/DL (ref ?–130)
OSMOLALITY SERPL CALC.SUM OF ELEC: 293 MOSM/KG (ref 275–295)
PLATELET # BLD AUTO: 239 10(3)UL (ref 150–450)
POTASSIUM SERPL-SCNC: 4.4 MMOL/L (ref 3.5–5.1)
PROT SERPL-MCNC: 6.8 G/DL (ref 6.4–8.2)
RBC # BLD AUTO: 4.31 X10(6)UL
SODIUM SERPL-SCNC: 141 MMOL/L (ref 136–145)
TRIGL SERPL-MCNC: 156 MG/DL (ref 30–149)
TSI SER-ACNC: 1.73 MIU/ML (ref 0.36–3.74)
VLDLC SERPL CALC-MCNC: 26 MG/DL (ref 0–30)
WBC # BLD AUTO: 8.2 X10(3) UL (ref 4–11)

## 2023-05-22 PROCEDURE — 85025 COMPLETE CBC W/AUTO DIFF WBC: CPT

## 2023-05-22 PROCEDURE — 99214 OFFICE O/P EST MOD 30 MIN: CPT | Performed by: INTERNAL MEDICINE

## 2023-05-22 PROCEDURE — 80061 LIPID PANEL: CPT

## 2023-05-22 PROCEDURE — 1126F AMNT PAIN NOTED NONE PRSNT: CPT | Performed by: INTERNAL MEDICINE

## 2023-05-22 PROCEDURE — 84443 ASSAY THYROID STIM HORMONE: CPT

## 2023-05-22 PROCEDURE — 80053 COMPREHEN METABOLIC PANEL: CPT

## 2023-05-22 PROCEDURE — 83036 HEMOGLOBIN GLYCOSYLATED A1C: CPT

## 2023-05-22 PROCEDURE — 36415 COLL VENOUS BLD VENIPUNCTURE: CPT

## 2023-05-22 RX ORDER — LISINOPRIL 10 MG/1
10 TABLET ORAL DAILY
Qty: 90 TABLET | Refills: 1 | Status: SHIPPED | OUTPATIENT
Start: 2023-05-22

## 2023-05-22 RX ORDER — SIMVASTATIN 80 MG
80 TABLET ORAL NIGHTLY
Qty: 90 TABLET | Refills: 1 | Status: SHIPPED | OUTPATIENT
Start: 2023-05-22

## 2023-05-22 RX ORDER — PAROXETINE HYDROCHLORIDE 20 MG/1
20 TABLET, FILM COATED ORAL EVERY MORNING
Qty: 90 TABLET | Refills: 1 | Status: SHIPPED | OUTPATIENT
Start: 2023-05-22

## 2023-07-03 ENCOUNTER — MED REC SCAN ONLY (OUTPATIENT)
Dept: INTERNAL MEDICINE CLINIC | Facility: CLINIC | Age: 73
End: 2023-07-03

## 2023-09-25 ENCOUNTER — OFFICE VISIT (OUTPATIENT)
Dept: INTERNAL MEDICINE CLINIC | Facility: CLINIC | Age: 73
End: 2023-09-25

## 2023-09-25 VITALS
OXYGEN SATURATION: 100 % | HEART RATE: 82 BPM | HEIGHT: 62 IN | TEMPERATURE: 98 F | SYSTOLIC BLOOD PRESSURE: 116 MMHG | WEIGHT: 170 LBS | BODY MASS INDEX: 31.28 KG/M2 | DIASTOLIC BLOOD PRESSURE: 72 MMHG

## 2023-09-25 DIAGNOSIS — E11.9 TYPE 2 DIABETES MELLITUS WITHOUT COMPLICATION, WITHOUT LONG-TERM CURRENT USE OF INSULIN (HCC): ICD-10-CM

## 2023-09-25 DIAGNOSIS — E11.00 TYPE 2 DIABETES MELLITUS WITH HYPEROSMOLARITY WITHOUT COMA, WITHOUT LONG-TERM CURRENT USE OF INSULIN (HCC): ICD-10-CM

## 2023-09-25 DIAGNOSIS — Z00.00 ENCOUNTER FOR ANNUAL HEALTH EXAMINATION: Primary | ICD-10-CM

## 2023-09-25 LAB
CREAT UR-SCNC: 115 MG/DL
MICROALBUMIN UR-MCNC: 4.73 MG/DL
MICROALBUMIN/CREAT 24H UR-RTO: 41.1 UG/MG (ref ?–30)

## 2023-09-25 PROCEDURE — 1126F AMNT PAIN NOTED NONE PRSNT: CPT | Performed by: INTERNAL MEDICINE

## 2023-09-25 PROCEDURE — G0439 PPPS, SUBSEQ VISIT: HCPCS | Performed by: INTERNAL MEDICINE

## 2023-10-03 ENCOUNTER — TELEPHONE (OUTPATIENT)
Dept: INTERNAL MEDICINE CLINIC | Facility: CLINIC | Age: 73
End: 2023-10-03

## 2023-10-03 ENCOUNTER — HOSPITAL ENCOUNTER (OUTPATIENT)
Dept: MAMMOGRAPHY | Age: 73
Discharge: HOME | End: 2023-10-03
Attending: INTERNAL MEDICINE
Payer: MEDICARE

## 2023-10-03 DIAGNOSIS — Z12.31 ENCOUNTER FOR SCREENING MAMMOGRAM FOR BREAST CANCER: ICD-10-CM

## 2023-10-03 PROCEDURE — 77063 BREAST TOMOSYNTHESIS BI: CPT | Performed by: INTERNAL MEDICINE

## 2023-10-03 PROCEDURE — 77067 SCR MAMMO BI INCL CAD: CPT | Performed by: INTERNAL MEDICINE

## 2024-01-31 RX ORDER — SIMVASTATIN 80 MG
80 TABLET ORAL NIGHTLY
Qty: 90 TABLET | Refills: 3 | Status: SHIPPED | OUTPATIENT
Start: 2024-01-31

## 2024-01-31 RX ORDER — LISINOPRIL 10 MG/1
10 TABLET ORAL DAILY
Qty: 90 TABLET | Refills: 1 | Status: SHIPPED | OUTPATIENT
Start: 2024-01-31

## 2024-01-31 NOTE — TELEPHONE ENCOUNTER
Please review; protocol failed/ No protocol     Requested Prescriptions   Pending Prescriptions Disp Refills    metFORMIN 500 MG Oral Tab [Pharmacy Med Name: METFORMIN  MG TABLET] 180 tablet 1     Sig: Take 1 tablet (500 mg total) by mouth 2 (two) times daily with meals.       Diabetes Medication Protocol Failed - 1/30/2024 12:05 AM        Failed - Last A1C < 7.5 and within past 6 months     Lab Results   Component Value Date    A1C 6.2 (H) 05/22/2023             Passed - In person appointment or virtual visit in the past 6 mos or appointment in next 3 mos     Recent Outpatient Visits              4 months ago Encounter for annual health examination    [facility], Dariela Frankel MD    Office Visit    8 months ago [facility] hypertension    [facility]    [facility]    1 year ago COVID-19    [facility], Meredith Carmen APRN    Virtual Phone E/M    1 year ago Type 2 diabetes mellitus with hyperosmolarity without coma, without long-term current use of insulin (HCC)    [facility]    [facility] Visit    2 years ago Encounter for annual health examination    [facility]    [facility]                      Passed - EGFRCR or GFRNAA > 50     GFR Evaluation  EGFRCR: 92 , resulted on 5/22/2023          Passed - GFR in the past 12 months          lisinopril 10 MG Oral Tab [Pharmacy Med Name: LISINOPRIL 10 MG TABLET] 90 tablet 1     Sig: Take 1 tablet (10 mg total) by mouth daily.       Hypertensive Medications Protocol Failed - 1/30/2024 12:05 AM        Failed - CMP or BMP in past 6 months     No results found for this or any previous visit (from the past 4392 hour(s)).            Passed - In person appointment in the past 12 or next 3 months     Recent Outpatient  Visits              4 months ago [facility]    [facility]    8 months ago [facility] hypertension    [facility]    [facility] Visit    1 year ago [address], Meredith Carmen, THOMAS    Virtual Phone E/M    1 year ago Type 2 diabetes mellitus with hyperosmolarity without coma, without long-term current use of insulin ([facility]    [facility]    [facility]    2 years ago Encounter for annual health examination    [facility]    [facility]                      Passed - Last BP reading less than 140/90     BP Readings from Last 1 Encounters:   09/25/23 116/72               Passed - In person appointment or virtual visit in the past 6 months     Recent Outpatient Visits              4 months ago Encounter for annual health examination    [facility]    [facility]    8 months ago Primary hypertension    [facility]    Office Visit    1 year ago [address] [place]AS    Virtual Phone E/M    1 year ago Type 2 diabetes mellitus with hyperosmolarity without coma, without long-term current use of insulin ([facility])    [facility]    [facility] Visit    2 years ago Encounter for annual health examination    [facility]    [facility]                      Passed - EGFRCR or GFRNAA > 50     GFR Evaluation  EGFRCR: 92 , resulted on 5/22/2023           Signed Prescriptions Disp Refills    simvastatin 80 MG Oral Tab  90 tablet 3     Sig: Take 1 tablet (80 mg total) by mouth nightly. TAKE AT BEDTIME       Cholesterol Medication Protocol Passed - 1/30/2024 12:05 AM        Passed - ALT in past 12 months        Passed - LDL in past 12 months        Passed - Last ALT < 80     Lab Results   Component Value Date    ALT 21 05/22/2023             Passed - Last LDL < 130     Lab Results   Component Value Date    LDL 95 05/22/2023             Passed - In person appointment or virtual visit in the past 12 mos or appointment in next 3 mos     Recent Outpatient Visits              4 months ago [facility]    8 months ago [facility] hypertension    [facility]    [facility]    1 year [address]    Virtual Phone E/M    1 year ago Type 2 diabetes mellitus with hyperosmolarity without coma, without long-term current use of insulin ([facility]    [facility]    [facility]    2 years ago Encounter for annual health examination    [facility]    [facility]                           Recent Outpatient Visits              4 months ago Encounter for annual health examination    [facility]    [facility]    8 months ago Primary hypertension    [facility]    [facility]    1 year ago [address]    Virtual Phone E/M    1 year ago Type 2 diabetes mellitus with hyperosmolarity without coma, without long-term current use of insulin ([facility]    [facility]    Office Visit    2 years ago Encounter for annual health examination    [facility], [facility] Dariela Orantes MD    Office Visit

## 2024-01-31 NOTE — TELEPHONE ENCOUNTER
Refill passed per [facility] protocol.    Requested Prescriptions   Pending Prescriptions Disp Refills    METFORMIN 500 MG Oral Tab [Pharmacy Med Name: METFORMIN  MG TABLET] 180 tablet 1     Sig: TAKE 1 TABLET BY MOUTH TWICE A DAY WITH MEALS       Diabetes Medication Protocol Failed - 1/30/2024 12:05 AM        Failed - Last A1C < 7.5 and within past 6 months     Lab Results   Component Value Date    A1C 6.2 (H) 05/22/2023             Passed - In person appointment or virtual visit in the past 6 mos or appointment in next 3 mos     Recent Outpatient Visits              4 months ago Encounter for annual health examination    [facility], Dariela Frankel MD    Office Visit    8 months ago [facility]    [facility]    1 year ago COVID-19    [facility], Meredith Carmen APRN    Virtual Phone E/M    1 year ago Type 2 diabetes mellitus with hyperosmolarity without coma, without long-term current use of insulin (HCC)    [facility]    [facility]    2 years ago Encounter for annual health examination    [facility]                      Passed - EGFRCR or GFRNAA > 50     GFR Evaluation  EGFRCR: 92 , resulted on 5/22/2023          Passed - GFR in the past 12 months          LISINOPRIL 10 MG Oral Tab [Pharmacy Med Name: LISINOPRIL 10 MG TABLET] 90 tablet 1     Sig: TAKE 1 TABLET BY MOUTH EVERY DAY       Hypertensive Medications Protocol Failed - 1/30/2024 12:05 AM        Failed - CMP or BMP in past 6 months     No results found for this or any previous visit (from the past 4392 hour(s)).            Passed - In person appointment in the past 12 or next 3 months     Recent Outpatient Visits              4 months ago  [facility] annual health [facility]    [facility]    8 months ago Primary hypertension    [facility]    Office Visit    1 year ago [address]    Virtual Phone E/M    1 year ago Type 2 diabetes mellitus with hyperosmolarity without coma, without long-term current use of insulin ([facility])    [facility]    Office Visit    2 years ago Encounter for annual health examination    [facility]    [facility]                      Passed - Last BP reading less than 140/90     BP Readings from Last 1 Encounters:   09/25/23 116/72               Passed - In person appointment or virtual visit in the past 6 months     Recent Outpatient Visits              4 months ago Encounter for annual health examination    [facility]    Office Visit    8 months ago Primary hypertension    [facility]    Office Visit    1 year ago [address]    Virtual Phone E/M    1 year ago Type 2 diabetes mellitus with hyperosmolarity without coma, without long-term current use of insulin ([facility])    [facility]    Office Visit    2 years ago Encounter for annual health examination    [facility]    [facility]                      Passed - EGFRCR or GFRNAA > 50     GFR Evaluation  EGFRCR: 92 , resulted on 5/22/2023            SIMVASTATIN 80 MG Oral Tab [Pharmacy Med Name: SIMVASTATIN 80 MG TABLET] 90 tablet 1     Sig:  Take 1 tablet (80 mg total) by mouth nightly. TAKE AT BEDTIME       Cholesterol Medication Protocol Passed - 1/30/2024 12:05 AM        Passed - ALT in past 12 months        Passed - LDL in past 12 months        Passed - Last ALT < 80     Lab Results   Component Value Date    ALT 21 05/22/2023             Passed - Last LDL < 130     Lab Results   Component Value Date    LDL 95 05/22/2023             Passed - In person appointment or virtual visit in the past 12 mos or appointment in next 3 mos     Recent Outpatient Visits              4 months ago [facility]    8 months ago [facility] hypertension    [facility]    [facility]    1 year ago [address], Meredith Carmen APRN    Virtual Phone E/M    1 year ago Type 2 diabetes mellitus with hyperosmolarity without coma, without long-term current use of insulin ([facility])    [facility]    [facility]    2 years ago Encounter for annual health examination    [facility]    [facility]                           Recent Outpatient Visits              4 months ago Encounter for annual health examination    [facility]    [facility]    8 months ago Primary hypertension    [facility]    [facility] Visit    1 year ago [address] APR[address]    Virtual Phone E/M    1 year ago Type 2 diabetes mellitus with hyperosmolarity without coma, without long-term current use of insulin ([facility])    [facility]     Office Visit    2 years ago Encounter for annual health examination    [facility], Dariela Frankel MD    Office Visit

## 2024-05-10 ENCOUNTER — TELEPHONE (OUTPATIENT)
Dept: INTERNAL MEDICINE CLINIC | Facility: CLINIC | Age: 74
End: 2024-05-10

## 2024-05-13 ENCOUNTER — OFFICE VISIT (OUTPATIENT)
Dept: INTERNAL MEDICINE CLINIC | Facility: CLINIC | Age: 74
End: 2024-05-13

## 2024-05-13 VITALS
BODY MASS INDEX: 29.26 KG/M2 | HEART RATE: 73 BPM | TEMPERATURE: 97 F | HEIGHT: 62 IN | OXYGEN SATURATION: 100 % | DIASTOLIC BLOOD PRESSURE: 85 MMHG | SYSTOLIC BLOOD PRESSURE: 130 MMHG | WEIGHT: 159 LBS

## 2024-05-13 DIAGNOSIS — I10 PRIMARY HYPERTENSION: Primary | ICD-10-CM

## 2024-05-13 PROCEDURE — 99214 OFFICE O/P EST MOD 30 MIN: CPT | Performed by: INTERNAL MEDICINE

## 2024-05-13 RX ORDER — PAROXETINE HYDROCHLORIDE 20 MG/1
20 TABLET, FILM COATED ORAL EVERY MORNING
Qty: 90 TABLET | Refills: 1 | Status: SHIPPED | OUTPATIENT
Start: 2024-05-13

## 2024-05-13 RX ORDER — SIMVASTATIN 80 MG
80 TABLET ORAL NIGHTLY
Qty: 90 TABLET | Refills: 3 | Status: SHIPPED | OUTPATIENT
Start: 2024-05-13

## 2024-05-13 RX ORDER — LISINOPRIL 10 MG/1
10 TABLET ORAL DAILY
Qty: 90 TABLET | Refills: 1 | Status: SHIPPED | OUTPATIENT
Start: 2024-05-13

## 2024-05-13 NOTE — PROGRESS NOTES
Subjective:     Patient ID: Jayshree Lau is a 73 year old female.    Diabetes    Hypertension        History/Other:   She came in today for follow-up on her blood pressure and diabetes.  According to her she is monitoring blood pressure at home is well-controlled.  She is taking her medications regularly.  She needs refill on her medication    dm is well-controlled.  She is taking her metformin.  She needs refill on her medication.  She did not schedule appointment with ophthalmology yet  Review of Systems   Constitutional: Negative.    HENT: Negative.     Eyes: Negative.    Respiratory: Negative.     Cardiovascular: Negative.    Gastrointestinal: Negative.    Endocrine: Negative.    Genitourinary: Negative.    Musculoskeletal: Negative.    Skin: Negative.    Neurological: Negative.    Hematological: Negative.    Psychiatric/Behavioral: Negative.       Current Outpatient Medications   Medication Sig Dispense Refill    lisinopril 10 MG Oral Tab Take 1 tablet (10 mg total) by mouth daily. 90 tablet 1    metFORMIN 500 MG Oral Tab Take 1 tablet (500 mg total) by mouth 2 (two) times daily with meals. 180 tablet 1    PARoxetine 20 MG Oral Tab Take 1 tablet (20 mg total) by mouth every morning. 90 tablet 1    simvastatin 80 MG Oral Tab Take 1 tablet (80 mg total) by mouth nightly. TAKE AT BEDTIME 90 tablet 3    Fexofenadine HCl (ALLEGRA) 180 MG Oral Tab Take 1 tablet (180 mg total) by mouth daily. 90 tablet 0    Multiple Vitamins-Minerals (MULTI-VITAMIN/MINERALS) Oral Tab Take 1 tablet by mouth daily. 90 tablet 0     Allergies:  Allergies   Allergen Reactions    Aspirin HIVES and SWELLING    Codeine OTHER (SEE COMMENTS)     headache    Seasonal        Past Medical History:    Anxiety state, unspecified    Colon polyp    Diabetes mellitus (HCC)    Essential hypertension    Generalized headaches    High blood pressure    High cholesterol    Obesity    Type II or unspecified type diabetes mellitus without mention of  complication, not stated as uncontrolled    Unspecified essential hypertension    Unspecified sleep apnea      Past Surgical History:   Procedure Laterality Date    Breast biopsy Left     Cataract  04/2018    bilateral    Colonoscopy      Colonoscopy N/A 6/7/2017    Procedure: COLONOSCOPY;  Surgeon: Nate Andrade MD;  Location: [facility] ENDOSCOPY    Colonoscopy N/A 10/6/2022    Procedure: COLONOSCOPY;  Surgeon: Nate Andrade MD;  Location: [facility] ENDO    Robin localization wire 1 site left (cpt=19281)      2000    Myomectomy 5/> intramural myomas &/or total wt >250 gms, abdominal approach      SP RPSL    Tonsillectomy        Family History   Problem Relation Age of Onset    Gastro-Intestinal Disorder Father         GI bleed    Arthritis Father         Rheumatoid    Heart Disease Mother     Hypertension Sister     Psychiatric Sister     Ovarian Cancer Paternal Aunt         40s      Social History:   Social History     Socioeconomic History    Marital status: Single   Tobacco Use    Smoking status: Never    Smokeless tobacco: Never   Substance and Sexual Activity    Alcohol use: Yes     Alcohol/week: 1.0 standard drink of alcohol     Comment: Occasionally once a week or less    Drug use: No   Other Topics Concern    Caffeine Concern Yes     Comment: Chocolate, soda, coffee        Objective:   Physical Exam  Vitals and nursing note reviewed.   Constitutional:       Appearance: Normal appearance.   HENT:      Head: Normocephalic and atraumatic.   Cardiovascular:      Rate and Rhythm: Normal rate and regular rhythm.      Pulses: Normal pulses.      Heart sounds: Normal heart sounds.   Pulmonary:      Effort: Pulmonary effort is normal.      Breath sounds: No rhonchi.   Abdominal:      Palpations: Abdomen is soft.   Musculoskeletal:         General: Normal range of motion.      Cervical back: Normal range of motion and neck supple.   Skin:     General: Skin is warm.   Neurological:      Mental Status: She is alert.  Mental status is at baseline.         Assessment & Plan:   No diagnosis found.  htn controlled continue with current medication watch diet avoid salty food    2 dm type2-I ordered repeat hemoglobin A1c continue with current medication watch diet, avoid carbs  No orders of the defined types were placed in this encounter.      Meds This Visit:  Requested Prescriptions     Signed Prescriptions Disp Refills    lisinopril 10 MG Oral Tab 90 tablet 1     Sig: Take 1 tablet (10 mg total) by mouth daily.    metFORMIN 500 MG Oral Tab 180 tablet 1     Sig: Take 1 tablet (500 mg total) by mouth 2 (two) times daily with meals.    PARoxetine 20 MG Oral Tab 90 tablet 1     Sig: Take 1 tablet (20 mg total) by mouth every morning.    simvastatin 80 MG Oral Tab 90 tablet 3     Sig: Take 1 tablet (80 mg total) by mouth nightly. TAKE AT BEDTIME       Imaging & Referrals:  None

## 2024-08-05 ENCOUNTER — MED REC SCAN ONLY (OUTPATIENT)
Dept: INTERNAL MEDICINE CLINIC | Facility: CLINIC | Age: 74
End: 2024-08-05

## 2024-08-09 ENCOUNTER — LAB ENCOUNTER (OUTPATIENT)
Dept: LAB | Facility: HOSPITAL | Age: 74
End: 2024-08-09
Attending: INTERNAL MEDICINE
Payer: MEDICARE

## 2024-08-09 DIAGNOSIS — E11.00 TYPE 2 DIABETES MELLITUS WITH HYPEROSMOLARITY WITHOUT COMA, WITHOUT LONG-TERM CURRENT USE OF INSULIN (HCC): ICD-10-CM

## 2024-08-09 LAB
ALBUMIN SERPL-MCNC: 4.6 G/DL (ref 3.2–4.8)
ALBUMIN/GLOB SERPL: 2.2 {RATIO} (ref 1–2)
ALP LIVER SERPL-CCNC: 73 U/L
ALT SERPL-CCNC: 10 U/L
ANION GAP SERPL CALC-SCNC: 9 MMOL/L (ref 0–18)
AST SERPL-CCNC: 15 U/L (ref ?–34)
BILIRUB SERPL-MCNC: 0.7 MG/DL (ref 0.2–1.1)
BUN BLD-MCNC: 13 MG/DL (ref 9–23)
BUN/CREAT SERPL: 20.6 (ref 10–20)
CALCIUM BLD-MCNC: 9.8 MG/DL (ref 8.7–10.4)
CHLORIDE SERPL-SCNC: 110 MMOL/L (ref 98–112)
CHOLEST SERPL-MCNC: 150 MG/DL (ref ?–200)
CO2 SERPL-SCNC: 27 MMOL/L (ref 21–32)
CREAT BLD-MCNC: 0.63 MG/DL
CREAT UR-SCNC: 81.9 MG/DL
EGFRCR SERPLBLD CKD-EPI 2021: 94 ML/MIN/1.73M2 (ref 60–?)
EST. AVERAGE GLUCOSE BLD GHB EST-MCNC: 123 MG/DL (ref 68–126)
FASTING PATIENT LIPID ANSWER: YES
FASTING STATUS PATIENT QL REPORTED: YES
GLOBULIN PLAS-MCNC: 2.1 G/DL (ref 2–3.5)
GLUCOSE BLD-MCNC: 107 MG/DL (ref 70–99)
HBA1C MFR BLD: 5.9 % (ref ?–5.7)
HDLC SERPL-MCNC: 38 MG/DL (ref 40–59)
LDLC SERPL CALC-MCNC: 86 MG/DL (ref ?–100)
MICROALBUMIN UR-MCNC: 0.5 MG/DL
MICROALBUMIN/CREAT 24H UR-RTO: 6.1 UG/MG (ref ?–30)
NONHDLC SERPL-MCNC: 112 MG/DL (ref ?–130)
OSMOLALITY SERPL CALC.SUM OF ELEC: 303 MOSM/KG (ref 275–295)
POTASSIUM SERPL-SCNC: 4.2 MMOL/L (ref 3.5–5.1)
PROT SERPL-MCNC: 6.7 G/DL (ref 5.7–8.2)
SODIUM SERPL-SCNC: 146 MMOL/L (ref 136–145)
TRIGL SERPL-MCNC: 145 MG/DL (ref 30–149)
VLDLC SERPL CALC-MCNC: 23 MG/DL (ref 0–30)

## 2024-08-09 PROCEDURE — 83036 HEMOGLOBIN GLYCOSYLATED A1C: CPT

## 2024-08-09 PROCEDURE — 80061 LIPID PANEL: CPT

## 2024-08-09 PROCEDURE — 82570 ASSAY OF URINE CREATININE: CPT

## 2024-08-09 PROCEDURE — 82043 UR ALBUMIN QUANTITATIVE: CPT

## 2024-08-09 PROCEDURE — 80053 COMPREHEN METABOLIC PANEL: CPT

## 2024-08-09 PROCEDURE — 36415 COLL VENOUS BLD VENIPUNCTURE: CPT

## 2024-10-10 ENCOUNTER — MED REC SCAN ONLY (OUTPATIENT)
Dept: INTERNAL MEDICINE CLINIC | Facility: CLINIC | Age: 74
End: 2024-10-10

## 2024-10-16 ENCOUNTER — OFFICE VISIT (OUTPATIENT)
Dept: INTERNAL MEDICINE CLINIC | Facility: CLINIC | Age: 74
End: 2024-10-16

## 2024-10-16 VITALS
BODY MASS INDEX: 27.42 KG/M2 | DIASTOLIC BLOOD PRESSURE: 83 MMHG | HEIGHT: 62 IN | RESPIRATION RATE: 18 BRPM | OXYGEN SATURATION: 100 % | HEART RATE: 98 BPM | TEMPERATURE: 97 F | SYSTOLIC BLOOD PRESSURE: 139 MMHG | WEIGHT: 149 LBS

## 2024-10-16 DIAGNOSIS — R63.4 WEIGHT LOSS: ICD-10-CM

## 2024-10-16 DIAGNOSIS — Z12.31 ENCOUNTER FOR SCREENING MAMMOGRAM FOR MALIGNANT NEOPLASM OF BREAST: Primary | ICD-10-CM

## 2024-10-16 DIAGNOSIS — E11.9 TYPE 2 DIABETES MELLITUS WITHOUT COMPLICATION, WITHOUT LONG-TERM CURRENT USE OF INSULIN (HCC): ICD-10-CM

## 2024-10-16 DIAGNOSIS — Z78.0 MENOPAUSE: ICD-10-CM

## 2024-10-16 DIAGNOSIS — Z00.00 MEDICARE ANNUAL WELLNESS VISIT, SUBSEQUENT: ICD-10-CM

## 2024-10-16 DIAGNOSIS — Z00.00 ENCOUNTER FOR ANNUAL HEALTH EXAMINATION: ICD-10-CM

## 2024-10-16 LAB
BASOPHILS # BLD AUTO: 0.06 X10(3) UL (ref 0–0.2)
BASOPHILS NFR BLD AUTO: 0.7 %
DEPRECATED RDW RBC AUTO: 41 FL (ref 35.1–46.3)
EOSINOPHIL # BLD AUTO: 0.12 X10(3) UL (ref 0–0.7)
EOSINOPHIL NFR BLD AUTO: 1.5 %
ERYTHROCYTE [DISTWIDTH] IN BLOOD BY AUTOMATED COUNT: 11.9 % (ref 11–15)
EST. AVERAGE GLUCOSE BLD GHB EST-MCNC: 120 MG/DL (ref 68–126)
HBA1C MFR BLD: 5.8 % (ref ?–5.7)
HCT VFR BLD AUTO: 40 %
HGB BLD-MCNC: 13.3 G/DL
IMM GRANULOCYTES # BLD AUTO: 0.01 X10(3) UL (ref 0–1)
IMM GRANULOCYTES NFR BLD: 0.1 %
LYMPHOCYTES # BLD AUTO: 2.53 X10(3) UL (ref 1–4)
LYMPHOCYTES NFR BLD AUTO: 31.5 %
MCH RBC QN AUTO: 31.1 PG (ref 26–34)
MCHC RBC AUTO-ENTMCNC: 33.3 G/DL (ref 31–37)
MCV RBC AUTO: 93.5 FL
MONOCYTES # BLD AUTO: 0.53 X10(3) UL (ref 0.1–1)
MONOCYTES NFR BLD AUTO: 6.6 %
NEUTROPHILS # BLD AUTO: 4.79 X10 (3) UL (ref 1.5–7.7)
NEUTROPHILS # BLD AUTO: 4.79 X10(3) UL (ref 1.5–7.7)
NEUTROPHILS NFR BLD AUTO: 59.6 %
PLATELET # BLD AUTO: 291 10(3)UL (ref 150–450)
RBC # BLD AUTO: 4.28 X10(6)UL
WBC # BLD AUTO: 8 X10(3) UL (ref 4–11)

## 2024-10-16 PROCEDURE — 85025 COMPLETE CBC W/AUTO DIFF WBC: CPT | Performed by: INTERNAL MEDICINE

## 2024-10-16 PROCEDURE — 83036 HEMOGLOBIN GLYCOSYLATED A1C: CPT | Performed by: INTERNAL MEDICINE

## 2024-10-16 PROCEDURE — 84443 ASSAY THYROID STIM HORMONE: CPT | Performed by: INTERNAL MEDICINE

## 2024-10-16 PROCEDURE — 80053 COMPREHEN METABOLIC PANEL: CPT | Performed by: INTERNAL MEDICINE

## 2024-10-16 NOTE — PROGRESS NOTES
Subjective:   Jayshree Lau is a 74 year old female who presents for a Subsequent Annual Wellness visit (Pt already had Initial Annual Wellness) and scheduled follow up of multiple significant but stable problems.       History/Other:   She is complaining of weight loss    Appetite is good she is eating okay   fall Risk Assessment:   [unfilled]     Cognitive Assessment:   She had a completely normal cognitive assessment - see flowsheet entries     Functional Ability/Status:   Jayshree Lau has some abnormal functions as listed below:  She has Vision problems based on screening of functional status.       Depression Screening (PHQ):  PHQ-2 SCORE: 0  , done 10/16/2024   If you checked off any problems, how difficult have these problems made it for you to do your work, take care of things at home, or get along with other people?: Not difficult at all    Last [place] Suicide Screening on 10/16/2024 was No Risk.     5 minutes spent screening and counseling for depression    Advanced Directives:   She does NOT have a Living Will. [ ]  She does NOT have a Power of  for Health Care. [ ]  Discussed Advance Care Planning with patient (and family/surrogate if present). Standard forms made available to patient in After Visit Summary.      Patient Active Problem List   Diagnosis    Diabetes mellitus (HCC)    Allergic rhinitis due to allergen    Hypercholesterolemia    Essential hypertension    Sleep apnea    Colon adenoma     Allergies:  She is allergic to aspirin, codeine, and seasonal.    Current Medications:  Outpatient Medications Marked as Taking for the 10/16/24 encounter (Office Visit) with Dariela Orantes MD   Medication Sig    lisinopril 10 MG Oral Tab Take 1 tablet (10 mg total) by mouth daily.    metFORMIN 500 MG Oral Tab Take 1 tablet (500 mg total) by mouth 2 (two) times daily with meals.    PARoxetine 20 MG Oral Tab Take 1 tablet (20 mg total) by mouth every morning.    simvastatin 80 MG  Oral Tab Take 1 tablet (80 mg total) by mouth nightly. TAKE AT BEDTIME    Fexofenadine HCl (ALLEGRA) 180 MG Oral Tab Take 1 tablet (180 mg total) by mouth daily.    Multiple Vitamins-Minerals (MULTI-VITAMIN/MINERALS) Oral Tab Take 1 tablet by mouth daily.       Medical History:  She  has a past medical history of Anxiety state, unspecified, Colon polyp, Diabetes mellitus (HCC), Essential hypertension, Generalized headaches, High blood pressure, High cholesterol, Obesity, Type II or unspecified type diabetes mellitus without mention of complication, not stated as uncontrolled, Unspecified essential hypertension, and Unspecified sleep apnea.  Surgical History:  She  has a past surgical history that includes Breast biopsy (Left); myomectomy 5/> intramural myomas &/or total wt >250 gms, abdominal approach; colonoscopy; tonsillectomy; colonoscopy (N/A, 6/7/2017); cataract (04/2018); lonnie localization wire 1 site left (cpt=19281); and colonoscopy (N/A, 10/6/2022).   Family History:  Her family history includes Arthritis in her father; Gastro-Intestinal Disorder in her father; Heart Disease in her mother; Hypertension in her sister; Ovarian Cancer in her paternal aunt; Psychiatric in her sister.  Social History:  She  reports that she has never smoked. She has never used smokeless tobacco. She reports current alcohol use of about 1.0 standard drink of alcohol per week. She reports that she does not use drugs.    Tobacco:      CAGE Alcohol Screen:         Patient Care Team:  Dariela Orantes MD as PCP - General (Internal Medicine)    Review of Systems  GENERAL: feels well otherwise  SKIN: denies any unusual skin lesions  EYES: denies blurred vision or double vision  HEENT: denies nasal congestion, sinus pain or ST  LUNGS: denies shortness of breath with exertion  CARDIOVASCULAR: denies chest pain on exertion  GI: denies abdominal pain, denies heartburn  : denies dysuria, vaginal discharge or itching, no complaint of urinary  incontinence   MUSCULOSKELETAL: denies back pain  NEURO: denies headaches  PSYCHE: denies depression or anxiety  HEMATOLOGIC: denies hx of anemia  ENDOCRINE: denies thyroid history  ALL/ASTHMA: denies hx of allergy or asthma    Objective:   Physical Exam  General Appearance:  Alert, cooperative, no distress, appears stated age   Head:  Normocephalic, without obvious abnormality, atraumatic   Eyes:  PERRL, conjunctiva/corneas clear, EOM's intact both eyes   Ears:  Normal TM's and external ear canals, both ears   Nose: Nares normal, septum midline,mucosa normal, no drainage or sinus tenderness   Throat: Lips, mucosa, and tongue normal; teeth and gums normal   Neck: Supple, symmetrical, trachea midline, no adenopathy;  thyroid: not enlarged, symmetric, no tenderness/mass/nodules; no carotid bruit or JVD   Back:   Symmetric, no curvature, ROM normal, no CVA tenderness   Lungs:   Clear to auscultation bilaterally, respirations unlabored   Heart:  Regular rate and rhythm, S1 and S2 normal, no murmur, rub, or gallop   Abdomen:   Soft, non-tender, bowel sounds active all four quadrants,  no masses, no organomegaly   Pelvic: Deferred   Extremities: Extremities normal, atraumatic, no cyanosis or edema   Pulses: 2+ and symmetric   Skin: Skin color, texture, turgor normal, no rashes or lesions   Lymph nodes: Cervical, supraclavicular, and axillary nodes normal   Neurologic: Normal       /83 (BP Location: Right arm, Patient Position: Sitting, Cuff Size: large)   Pulse 98   Temp 97.4 °F (36.3 °C) (Temporal)   Resp 18   Ht 5' 2\" (1.575 m)   Wt 149 lb (67.6 kg)   SpO2 100%   BMI 27.25 kg/m²  Estimated body mass index is 27.25 kg/m² as calculated from the following:    Height as of this encounter: 5' 2\" (1.575 m).    Weight as of this encounter: 149 lb (67.6 kg).    Medicare Hearing Assessment:   Hearing Screening    Screening Method: Whisper Test  Whisper Test Result: Pass         Visual Acuity:     Right Eye Chart  Acuity: 20/30   Left Eye Visual Acuity: Corrected Left Eye Chart Acuity: 20/40   Both Eyes Visual Acuity: Uncorrected Both Eyes Chart Acuity: 20/30   Able To Tolerate Visual Acuity: Yes        Assessment & Plan:   Jayshree Lau is a 74 year old female who presents for a Medicare Assessment.     1. Encounter for screening mammogram for malignant neoplasm of breast (Primary)  -     KENYA MAGDALENE 2D+3D SCREENING BILAT (CPT=77067/49318); Future; Expected date: 10/16/2024  2. Type 2 diabetes mellitus with hyperosmolarity without coma, without long-term current use of insulin (HCC)  -     Hemoglobin A1C; Future; Expected date: 10/16/2024  3. Encounter for annual health examination  4. Menopause  -     XR DEXA BONE DENSITOMETRY (CPT=77080); Future; Expected date: 10/16/2024  5. Weight loss  -     CBC With Differential With Platelet; Future; Expected date: 10/16/2024  -     Comp Metabolic Panel (14); Future; Expected date: 10/16/2024  -     TSH W Reflex To Free T4; Future; Expected date: 10/16/2024  -     CT CHEST+ABDOMEN+PELVIS(ALL CNTRST ONLY)(PIV=77161/02076); Future; Expected date: 10/16/2024  6. Medicare annual wellness visit, subsequent  7.Type 2 diabetes mellitus with without long-term current use of insulin (HCC)  Well-controlled will check hemoglobin A1c     8.Hypertension controlled continue with current medication      9.Anxiety stable continue with paroxetine  10 Colon polyp stable - she is following with gi     11Allergic rhinitis continue with Allegra daily  12 Obstructive sleep apnea she is not using her CPAP discussed with her again  13. Hypercholesterolemia watch diet avoid fried food red meat more fruits and vegetables continue with current medications  The patient indicates understanding of these issues and agrees to the plan.  Reinforced healthy diet, lifestyle, and exercise.      No follow-ups on file.     TYLER PABON MD, 10/16/2024     Supplementary Documentation:   General Health:  At any time do  you feel concerned for the safety/well-being of yourself and/or your children, in your home or elsewhere?: No    Health Maintenance   Topic Date Due    Zoster Vaccines (2 of 3) 10/02/2017    Fall Risk Screening (Annual)  01/01/2024    Diabetes Care Foot Exam  05/22/2024    Annual Physical  09/25/2024    Mammogram  10/03/2024    Diabetes Care A1C  02/09/2025    Diabetes Care Dilated Eye Exam  08/01/2025    Diabetes Care: GFR  08/09/2025    Diabetes Care: Microalb/Creat Ratio  08/09/2025    Colorectal Cancer Screening  10/06/2025    Influenza Vaccine  Completed    DEXA Scan  Completed    Annual Depression Screening  Completed    Pneumococcal Vaccine: 65+ Years  Completed    COVID-19 Vaccine  Completed

## 2024-10-17 LAB
ALBUMIN SERPL-MCNC: 4.9 G/DL (ref 3.2–4.8)
ALBUMIN/GLOB SERPL: 2.5 {RATIO} (ref 1–2)
ALP LIVER SERPL-CCNC: 131 U/L
ALT SERPL-CCNC: 38 U/L
ANION GAP SERPL CALC-SCNC: 9 MMOL/L (ref 0–18)
AST SERPL-CCNC: 26 U/L (ref ?–34)
BILIRUB SERPL-MCNC: 0.5 MG/DL (ref 0.2–1.1)
BUN BLD-MCNC: 10 MG/DL (ref 9–23)
BUN/CREAT SERPL: 15.2 (ref 10–20)
CALCIUM BLD-MCNC: 10.1 MG/DL (ref 8.7–10.4)
CHLORIDE SERPL-SCNC: 106 MMOL/L (ref 98–112)
CO2 SERPL-SCNC: 27 MMOL/L (ref 21–32)
CREAT BLD-MCNC: 0.66 MG/DL
EGFRCR SERPLBLD CKD-EPI 2021: 93 ML/MIN/1.73M2 (ref 60–?)
GLOBULIN PLAS-MCNC: 2 G/DL (ref 2–3.5)
GLUCOSE BLD-MCNC: 99 MG/DL (ref 70–99)
OSMOLALITY SERPL CALC.SUM OF ELEC: 293 MOSM/KG (ref 275–295)
POTASSIUM SERPL-SCNC: 3.9 MMOL/L (ref 3.5–5.1)
PROT SERPL-MCNC: 6.9 G/DL (ref 5.7–8.2)
SODIUM SERPL-SCNC: 142 MMOL/L (ref 136–145)
TSI SER-ACNC: 1.66 MIU/ML (ref 0.55–4.78)

## 2024-10-25 ENCOUNTER — HOSPITAL ENCOUNTER (OUTPATIENT)
Dept: MAMMOGRAPHY | Age: 74
Discharge: HOME | End: 2024-10-25
Attending: INTERNAL MEDICINE
Payer: MEDICARE

## 2024-10-25 DIAGNOSIS — Z12.31 ENCOUNTER FOR SCREENING MAMMOGRAM FOR MALIGNANT NEOPLASM OF BREAST: ICD-10-CM

## 2024-10-25 PROCEDURE — 77067 SCR MAMMO BI INCL CAD: CPT | Performed by: INTERNAL MEDICINE

## 2024-10-25 PROCEDURE — 77063 BREAST TOMOSYNTHESIS BI: CPT | Performed by: INTERNAL MEDICINE

## 2024-11-12 ENCOUNTER — HOSPITAL ENCOUNTER (OUTPATIENT)
Dept: CT IMAGING | Facility: HOSPITAL | Age: 74
Discharge: HOME | End: 2024-11-12
Attending: INTERNAL MEDICINE
Payer: MEDICARE

## 2024-11-12 DIAGNOSIS — R63.4 WEIGHT LOSS: ICD-10-CM

## 2024-11-12 PROCEDURE — 74177 CT ABD & PELVIS W/CONTRAST: CPT | Performed by: INTERNAL MEDICINE

## 2024-11-12 PROCEDURE — 71260 CT THORAX DX C+: CPT | Performed by: INTERNAL MEDICINE

## 2024-11-12 RX ORDER — PAROXETINE 20 MG/1
20 TABLET, FILM COATED ORAL EVERY MORNING
Qty: 90 TABLET | Refills: 3 | Status: SHIPPED | OUTPATIENT
Start: 2024-11-12

## 2024-11-12 NOTE — TELEPHONE ENCOUNTER
Refill passed per [facility] protocol.  Requested Prescriptions   Pending Prescriptions Disp Refills    PAROXETINE 20 MG Oral Tab [Pharmacy Med Name: PAROXETINE HCL 20 MG TABLET] 90 tablet 1     Sig: TAKE 1 TABLET BY MOUTH EVERY DAY IN THE MORNING       Psychiatric Non-Scheduled (Anti-Anxiety) Passed - 11/12/2024 12:25 PM        Passed - In person appointment or virtual visit in the past 6 mos or appointment in next 3 mos     Recent Outpatient Visits              3 weeks ago Medicare annual wellness visit, subsequent    [facility]    6 months ago Primary hypertension    [facility]    [facility]    1 year ago Encounter for annual health examination    [facility]    [facility]    1 year ago Primary hypertension    [facility]    [facility]    1 year ago COVID-19    [facility]    [facility] E/M          Future Appointments         Provider Department Appt Notes    In 3 weeks [facility] RM1; [facility] DEXA RM1 [facility] [place] .    In 2 months Dariela Orantes MD [address]                    Passed - Depression Screening completed within the past 12 months           Future Appointments         Provider Department Appt Notes    In 3 weeks [facility] RM1; [facility] DEXA RM1 [facility] [place] .    In 2 months [facility]          Recent Outpatient Visits              3 weeks ago Medicare annual wellness visit, subsequent    [facility] [place] Dariela Orantes MD    Office Visit    6 months ago Primary hypertension     [facility], Dariela Frankel MD    Office Visit    1 year ago Encounter for annual health examination    [facility], Dariela Frankel MD    Office Visit    1 year ago Primary hypertension    [facility], Dariela Frankel MD    Office Visit    1 year ago COVID-19    [facility], Meredith Carmen APRN    Virtual Phone E/M

## 2024-12-07 ENCOUNTER — HOSPITAL ENCOUNTER (OUTPATIENT)
Dept: BONE DENSITY | Age: 74
Discharge: HOME | End: 2024-12-07
Attending: INTERNAL MEDICINE
Payer: MEDICARE

## 2024-12-07 DIAGNOSIS — Z78.0 MENOPAUSE: ICD-10-CM

## 2024-12-07 PROCEDURE — 77080 DXA BONE DENSITY AXIAL: CPT | Performed by: INTERNAL MEDICINE

## 2024-12-14 ENCOUNTER — HOSPITAL ENCOUNTER (OUTPATIENT)
Age: 74
Discharge: HOME | End: 2024-12-14
Payer: MEDICARE

## 2024-12-14 VITALS
RESPIRATION RATE: 20 BRPM | SYSTOLIC BLOOD PRESSURE: 122 MMHG | TEMPERATURE: 98 F | DIASTOLIC BLOOD PRESSURE: 107 MMHG | OXYGEN SATURATION: 99 % | HEART RATE: 92 BPM

## 2024-12-14 DIAGNOSIS — N30.01 ACUTE CYSTITIS WITH HEMATURIA: Primary | ICD-10-CM

## 2024-12-14 LAB
GLUCOSE UR STRIP-MCNC: NEGATIVE MG/DL
NITRITE UR QL STRIP: NEGATIVE
PH UR STRIP: 7 [PH]
PROT UR STRIP-MCNC: >=300 MG/DL
SP GR UR STRIP: 1.02
UROBILINOGEN UR STRIP-ACNC: 2 MG/DL

## 2024-12-14 PROCEDURE — 99214 OFFICE O/P EST MOD 30 MIN: CPT | Performed by: PHYSICIAN ASSISTANT

## 2024-12-14 PROCEDURE — 81002 URINALYSIS NONAUTO W/O SCOPE: CPT | Performed by: PHYSICIAN ASSISTANT

## 2024-12-14 RX ORDER — NITROFURANTOIN 25; 75 MG/1; MG/1
100 CAPSULE ORAL 2 TIMES DAILY
Qty: 14 CAPSULE | Refills: 0 | Status: SHIPPED | OUTPATIENT
Start: 2024-12-14 | End: 2024-12-21

## 2024-12-14 RX ORDER — PHENAZOPYRIDINE HYDROCHLORIDE 200 MG/1
200 TABLET, FILM COATED ORAL 3 TIMES DAILY PRN
Qty: 6 TABLET | Refills: 0 | Status: SHIPPED | OUTPATIENT
Start: 2024-12-14 | End: 2024-12-21

## 2024-12-14 NOTE — ED PROVIDER NOTES
Patient Seen in: [facility]      History     Chief Complaint   Patient presents with    Urinary Symptoms     Stated Complaint: UTI    Subjective:   HPI      Patient is a 74-year-old female with history of hypertension, hyperlipidemia, type 2 diabetes, presenting to immediate care for concern for urinary tract infection.  She is symptomatic.  Onset this morning.  Associate urinary urgency, frequency, dysuria, blood in urine.  No fevers.  No nausea or vomiting.  No back, flank, abdominal, pelvic pain.  No gross hematuria or blood clots.  No urinary retentions.  No history of frequent or recurrent UTIs.    Objective:     Past Medical History:    Anxiety state, unspecified    Colon polyp    Diabetes mellitus (HCC)    Essential hypertension    Generalized headaches    High blood pressure    High cholesterol    Obesity    Type II or unspecified type diabetes mellitus without mention of complication, not stated as uncontrolled    Unspecified essential hypertension    Unspecified sleep apnea              Past Surgical History:   Procedure Laterality Date    Breast biopsy Left     Cataract  04/2018    bilateral    Colonoscopy      Colonoscopy N/A 6/7/2017    Procedure: COLONOSCOPY;  Surgeon: Nate Andrade MD;  Location: [facility] ENDOSCOPY    Colonoscopy N/A 10/6/2022    Procedure: COLONOSCOPY;  Surgeon: Nate Andrade MD;  Location: [facility] localization wire 1 site left (cpt=19281)      2000    Myomectomy 5/> intramural myomas &/or total wt >250 gms, abdominal approach      SP RPSL    Tonsillectomy                  No pertinent social history.            Review of Systems   Constitutional:  Negative for chills and fever.   Gastrointestinal:  Negative for abdominal pain, nausea and vomiting.   Genitourinary:  Positive for dysuria, frequency, hematuria and urgency. Negative for pelvic pain.   Musculoskeletal:  Negative for back pain.   Psychiatric/Behavioral:  Negative for confusion.         Positive for stated complaint: UTI  Other systems are as noted in HPI.  Constitutional and vital signs reviewed.      All other systems reviewed and negative except as noted above.    Physical Exam     ED Triage Vitals [12/14/24 1256]   BP (!) 122/107   Pulse 92   Resp 20   Temp 97.7 °F (36.5 °C)   Temp src Oral   SpO2 99 %   O2 Device None (Room air)       Current Vitals:   Vital Signs  BP: (!) 122/107  Pulse: 92  Resp: 20  Temp: 97.7 °F (36.5 °C)  Temp src: Oral    Oxygen Therapy  SpO2: 99 %  O2 Device: None (Room air)        Physical Exam  Vitals and nursing note reviewed.   Constitutional:       General: She is not in acute distress.     Appearance: Normal appearance. She is well-developed. She is not ill-appearing, toxic-appearing or diaphoretic.   HENT:      Head: Normocephalic and atraumatic.      Mouth/Throat:      Mouth: Mucous membranes are moist.   Eyes:      General: No scleral icterus.  Cardiovascular:      Rate and Rhythm: Normal rate and regular rhythm.   Pulmonary:      Effort: No respiratory distress.   Abdominal:      General: There is no distension.      Palpations: Abdomen is soft.      Tenderness: There is no abdominal tenderness.   Musculoskeletal:         General: No deformity. Normal range of motion.   Skin:     Findings: No rash.   Neurological:      General: No focal deficit present.      Mental Status: She is alert.      Motor: No weakness.   Psychiatric:         Mood and Affect: Mood normal.         Behavior: Behavior normal.         ED Course     Labs Reviewed   [facility] POCT URINALYSIS DIPSTICK - Abnormal; Notable for the following components:       Result Value    Urine Color Kimberlyn (*)     Urine Clarity Cloudy (*)     Protein urine >=300 (*)     Ketone, Urine Trace (*)     Bilirubin, Urine Small (*)     Blood, Urine Large (*)     Urobilinogen urine 2.0 (*)     Leukocyte esterase urine Trace (*)     All other components within normal limits   URINE CULTURE, ROUTINE     Results for  orders placed or performed during the hospital encounter of 12/14/24   POCT Urinalysis Dipstick    Collection Time: 12/14/24  1:24 PM   Result Value Ref Range    Urine Color Kimberlyn (A) Yellow    Urine Clarity Cloudy (A) Clear    Specific Gravity, Urine 1.025 1.005 - 1.030    PH, Urine 7.0 5.0 - 8.0    Protein urine >=300 (A) Negative mg/dL    Glucose, Urine Negative Negative mg/dL    Ketone, Urine Trace (A) Negative mg/dL    Bilirubin, Urine Small (A) Negative    Blood, Urine Large (A) Negative    Nitrite Urine Negative Negative    Urobilinogen urine 2.0 (A) <2.0 mg/dL    Leukocyte esterase urine Trace (A) Negative            MDM     Dx: Acute cystitis with hematuria, initial encounter  Symptomatic  No systemic symptoms  Overall well appearing  Afebrile  Urine Dip suggestive of UTI  + leuks  + cloudy urine  + blood  No clinical signs of obstructing nephrolithiasis or pyelonephritis  Will treat for acute uncomplicated cystitis  Rx Macrobid BID for 7 days for UTI  Rx pyridium for dysuria  Urine culture sent, pending  PCP follow-up   Discharge instruction on bladder infection (female)  Patient understood and agrees to treatment plan  ED Return precautions  Stable for discharge        Medical Decision Making      Disposition and Plan     Clinical Impression:  1. Acute cystitis with hematuria         Disposition:  Discharge  12/14/2024  1:29 pm    Follow-up:  Dariela Orantes MD  [address] [place] [id number]  [phone]                Medications Prescribed:  Current Discharge Medication List        START taking these medications    Details   nitrofurantoin monohydrate macro 100 MG Oral Cap Take 1 capsule (100 mg total) by mouth 2 (two) times daily for 7 days.  Qty: 14 capsule, Refills: 0      phenazopyridine 200 MG Oral Tab Take 1 tablet (200 mg total) by mouth 3 (three) times daily as needed for Pain.  Qty: 6 tablet, Refills: 0                 Supplementary Documentation:

## 2024-12-30 ENCOUNTER — OFFICE VISIT (OUTPATIENT)
Dept: INTERNAL MEDICINE CLINIC | Facility: CLINIC | Age: 74
End: 2024-12-30

## 2024-12-30 VITALS
SYSTOLIC BLOOD PRESSURE: 135 MMHG | HEIGHT: 62 IN | DIASTOLIC BLOOD PRESSURE: 75 MMHG | OXYGEN SATURATION: 100 % | HEART RATE: 73 BPM | WEIGHT: 148 LBS | BODY MASS INDEX: 27.23 KG/M2

## 2024-12-30 DIAGNOSIS — N30.00 ACUTE CYSTITIS WITHOUT HEMATURIA: Primary | ICD-10-CM

## 2024-12-30 LAB
APPEARANCE: CLEAR
BILIRUBIN: NEGATIVE
GLUCOSE (URINE DIPSTICK): NEGATIVE MG/DL
KETONES (URINE DIPSTICK): NEGATIVE MG/DL
MULTISTIX LOT#: ABNORMAL NUMERIC
NITRITE, URINE: NEGATIVE
OCCULT BLOOD: NEGATIVE
PH, URINE: 7.5 (ref 4.5–8)
PROTEIN (URINE DIPSTICK): NEGATIVE MG/DL
SPECIFIC GRAVITY: 1.02 (ref 1–1.03)
URINE-COLOR: YELLOW
UROBILINOGEN,SEMI-QN: 0.2 MG/DL (ref 0–1.9)

## 2024-12-30 PROCEDURE — 99213 OFFICE O/P EST LOW 20 MIN: CPT | Performed by: INTERNAL MEDICINE

## 2024-12-30 PROCEDURE — 81003 URINALYSIS AUTO W/O SCOPE: CPT | Performed by: INTERNAL MEDICINE

## 2024-12-30 NOTE — PROGRESS NOTES
Subjective:     Patient ID: Jayshree Lau is a 74 year old female.    HPI    History/Other: Came in today for follow-up from urgent care.  She went to urgent care on December 14 because of UTI symptoms she was diagnosed with UTI started on antibiotic for 7 days.  She states that once she finished antibiotics she feels like her symptoms are coming back she does have some urgency to urinate and some discomfort at the end of urination.  Review of Systems   Constitutional: Negative.    HENT: Negative.     Eyes: Negative.    Respiratory: Negative.     Cardiovascular: Negative.    Gastrointestinal: Negative.    Endocrine: Negative.    Genitourinary: Negative.    Musculoskeletal: Negative.    Skin: Negative.    Neurological: Negative.    Hematological: Negative.    Psychiatric/Behavioral: Negative.       Current Outpatient Medications   Medication Sig Dispense Refill    PARoxetine 20 MG Oral Tab Take 1 tablet (20 mg total) by mouth every morning. 90 tablet 3    lisinopril 10 MG Oral Tab Take 1 tablet (10 mg total) by mouth daily. 90 tablet 1    metFORMIN 500 MG Oral Tab Take 1 tablet (500 mg total) by mouth 2 (two) times daily with meals. 180 tablet 1    simvastatin 80 MG Oral Tab Take 1 tablet (80 mg total) by mouth nightly. TAKE AT BEDTIME 90 tablet 3    Fexofenadine HCl (ALLEGRA) 180 MG Oral Tab Take 1 tablet (180 mg total) by mouth daily. 90 tablet 0    Multiple Vitamins-Minerals (MULTI-VITAMIN/MINERALS) Oral Tab Take 1 tablet by mouth daily. 90 tablet 0     Allergies:Allergies[1]    Past Medical History:    Anxiety state, unspecified    Colon polyp    Diabetes mellitus (HCC)    Essential hypertension    Generalized headaches    High blood pressure    High cholesterol    Obesity    Type II or unspecified type diabetes mellitus without mention of complication, not stated as uncontrolled    Unspecified essential hypertension    Unspecified sleep apnea      Past Surgical History:   Procedure Laterality Date     Breast biopsy Left     Cataract  04/2018    bilateral    Colonoscopy      Colonoscopy N/A 6/7/2017    Procedure: COLONOSCOPY;  Surgeon: Nate Andrade MD;  Location: [facility] ENDOSCOPY    Colonoscopy N/A 10/6/2022    Procedure: COLONOSCOPY;  Surgeon: Nate Andrade MD;  Location: [facility] ENDO    Robin localization wire 1 site left (cpt=19281)      2000    Myomectomy 5/> intramural myomas &/or total wt >250 gms, abdominal approach      SP RPSL    Tonsillectomy        Family History   Problem Relation Age of Onset    Heart Disease Mother     Hypertension Mother     Gastro-Intestinal Disorder Father         GI bleed    Arthritis Father         Rheumatoid    Hypertension Sister     Asthma Sister     Psychiatric Sister     Dementia Sister     Ovarian Cancer Paternal Aunt         40s    Dementia Brother       Social History:   Social History     Socioeconomic History    Marital status: Single   Tobacco Use    Smoking status: Never    Smokeless tobacco: Never   Substance and Sexual Activity    Alcohol use: Yes     Alcohol/week: 1.0 standard drink of alcohol     Comment: Occasionally once a week or less    Drug use: No   Other Topics Concern    Caffeine Concern Yes     Comment: Chocolate, soda, coffee        Objective:   Physical Exam  Vitals and nursing note reviewed.   Constitutional:       Appearance: Normal appearance.   HENT:      Head: Normocephalic and atraumatic.   Cardiovascular:      Rate and Rhythm: Normal rate and regular rhythm.      Pulses: Normal pulses.      Heart sounds: Normal heart sounds.   Pulmonary:      Effort: Pulmonary effort is normal.      Breath sounds: Normal breath sounds.   Abdominal:      Palpations: Abdomen is soft.   Musculoskeletal:         General: Normal range of motion.      Cervical back: Normal range of motion and neck supple.   Skin:     General: Skin is warm.   Neurological:      Mental Status: She is alert. Mental status is at baseline.   Psychiatric:         Mood and Affect:  Mood normal.         Assessment & Plan:   1. Acute cystitis without hematuria    I did advise her to drink plenty of fluids always wipe front to back never back to front will send urine for culture    Orders Placed This Encounter   Procedures    Urine Culture, Routine [E]       Meds This Visit:  Requested Prescriptions      No prescriptions requested or ordered in this encounter       Imaging & Referrals:  None            [1]   Allergies  Allergen Reactions    Aspirin HIVES and SWELLING    Codeine OTHER (SEE COMMENTS)     headache    Seasonal

## 2025-01-22 ENCOUNTER — OFFICE VISIT (OUTPATIENT)
Dept: INTERNAL MEDICINE CLINIC | Facility: CLINIC | Age: 75
End: 2025-01-22

## 2025-01-22 VITALS
DIASTOLIC BLOOD PRESSURE: 79 MMHG | HEIGHT: 62 IN | OXYGEN SATURATION: 100 % | BODY MASS INDEX: 27.23 KG/M2 | SYSTOLIC BLOOD PRESSURE: 131 MMHG | HEART RATE: 86 BPM | WEIGHT: 148 LBS | TEMPERATURE: 98 F

## 2025-01-22 DIAGNOSIS — E11.9 TYPE 2 DIABETES MELLITUS WITHOUT COMPLICATION, WITHOUT LONG-TERM CURRENT USE OF INSULIN (HCC): ICD-10-CM

## 2025-01-22 DIAGNOSIS — N30.00 ACUTE CYSTITIS WITHOUT HEMATURIA: Primary | ICD-10-CM

## 2025-01-22 LAB
APPEARANCE: CLEAR
BILIRUBIN: NEGATIVE
CREAT UR-SCNC: 76.4 MG/DL
GLUCOSE (URINE DIPSTICK): NEGATIVE MG/DL
KETONES (URINE DIPSTICK): NEGATIVE MG/DL
MICROALBUMIN UR-MCNC: 0.5 MG/DL
MICROALBUMIN/CREAT 24H UR-RTO: 6.5 UG/MG (ref ?–30)
MULTISTIX LOT#: ABNORMAL NUMERIC
NITRITE, URINE: NEGATIVE
OCCULT BLOOD: NEGATIVE
PH, URINE: 5.5 (ref 4.5–8)
PROTEIN (URINE DIPSTICK): NEGATIVE MG/DL
SPECIFIC GRAVITY: 1.02 (ref 1–1.03)
URINE-COLOR: YELLOW
UROBILINOGEN,SEMI-QN: 0.2 MG/DL (ref 0–1.9)

## 2025-01-22 PROCEDURE — 99214 OFFICE O/P EST MOD 30 MIN: CPT | Performed by: INTERNAL MEDICINE

## 2025-01-22 PROCEDURE — 1159F MED LIST DOCD IN RCRD: CPT | Performed by: INTERNAL MEDICINE

## 2025-01-22 PROCEDURE — 1126F AMNT PAIN NOTED NONE PRSNT: CPT | Performed by: INTERNAL MEDICINE

## 2025-01-22 PROCEDURE — 81003 URINALYSIS AUTO W/O SCOPE: CPT | Performed by: INTERNAL MEDICINE

## 2025-01-22 RX ORDER — LISINOPRIL 10 MG/1
10 TABLET ORAL DAILY
Qty: 90 TABLET | Refills: 1 | Status: SHIPPED | OUTPATIENT
Start: 2025-01-22

## 2025-01-22 NOTE — PROGRESS NOTES
Subjective:     Patient ID: Jayshree Lau is a 74 year old female.    Hypertension        History/Other:   She came today for follow-up on her blood pressure and requesting refill on her medication.      She also states that she does have urinary frequency and urgency and slight discomfort at the end of urination.  She denies any abdominal pain or back pain.  Review of Systems   Constitutional: Negative.    HENT: Negative.     Eyes: Negative.    Respiratory: Negative.     Cardiovascular: Negative.    Gastrointestinal: Negative.    Genitourinary:  Positive for frequency and urgency.   Musculoskeletal: Negative.    Skin: Negative.    Neurological: Negative.    Psychiatric/Behavioral: Negative.       Current Outpatient Medications   Medication Sig Dispense Refill    lisinopril 10 MG Oral Tab Take 1 tablet (10 mg total) by mouth daily. 90 tablet 1    PARoxetine 20 MG Oral Tab Take 1 tablet (20 mg total) by mouth every morning. 90 tablet 3    metFORMIN 500 MG Oral Tab Take 1 tablet (500 mg total) by mouth 2 (two) times daily with meals. 180 tablet 1    simvastatin 80 MG Oral Tab Take 1 tablet (80 mg total) by mouth nightly. TAKE AT BEDTIME 90 tablet 3    Fexofenadine HCl (ALLEGRA) 180 MG Oral Tab Take 1 tablet (180 mg total) by mouth daily. 90 tablet 0    Multiple Vitamins-Minerals (MULTI-VITAMIN/MINERALS) Oral Tab Take 1 tablet by mouth daily. 90 tablet 0    nitrofurantoin monohydrate macro 100 MG Oral Cap Take 1 capsule (100 mg total) by mouth 2 (two) times daily. (Patient not taking: Reported on 1/22/2025) 14 capsule 0     Allergies:Allergies[1]    Past Medical History:    Anxiety state, unspecified    Colon polyp    Diabetes mellitus (HCC)    Essential hypertension    Generalized headaches    High blood pressure    High cholesterol    Obesity    Type II or unspecified type diabetes mellitus without mention of complication, not stated as uncontrolled    Unspecified essential hypertension    Unspecified sleep  apnea      Past Surgical History:   Procedure Laterality Date    Breast biopsy Left     Cataract  04/2018    bilateral    Colonoscopy      Colonoscopy N/A 6/7/2017    Procedure: COLONOSCOPY;  Surgeon: Nate Andrade MD;  Location: [facility] ENDOSCOPY    Colonoscopy N/A 10/6/2022    Procedure: COLONOSCOPY;  Surgeon: Nate Andrade MD;  Location: [facility] ENDO    Robin localization wire 1 site left (cpt=19281)      2000    Myomectomy 5/> intramural myomas &/or total wt >250 gms, abdominal approach      SP RPSL    Tonsillectomy        Family History   Problem Relation Age of Onset    Heart Disease Mother     Hypertension Mother     Gastro-Intestinal Disorder Father         GI bleed    Arthritis Father         Rheumatoid    Hypertension Sister     Asthma Sister     Psychiatric Sister     Dementia Sister     Ovarian Cancer Paternal Aunt         40s    Dementia Brother       Social History:   Social History     Socioeconomic History    Marital status: Single   Tobacco Use    Smoking status: Never    Smokeless tobacco: Never   Substance and Sexual Activity    Alcohol use: Yes     Alcohol/week: 1.0 standard drink of alcohol     Comment: Occasionally once a week or less    Drug use: No   Other Topics Concern    Caffeine Concern Yes     Comment: Chocolate, soda, coffee        Objective:   Physical Exam  Vitals and nursing note reviewed.   Constitutional:       Appearance: Normal appearance.   HENT:      Head: Normocephalic and atraumatic.   Cardiovascular:      Rate and Rhythm: Normal rate and regular rhythm.      Pulses: Normal pulses.      Heart sounds: Normal heart sounds.   Pulmonary:      Effort: Pulmonary effort is normal.      Breath sounds: Normal breath sounds.   Abdominal:      Palpations: Abdomen is soft.   Musculoskeletal:         General: Normal range of motion.      Cervical back: Normal range of motion and neck supple.   Skin:     General: Skin is warm.   Neurological:      Mental Status: She is alert. Mental  status is at baseline.   Psychiatric:         Mood and Affect: Mood normal.         Assessment & Plan:   1. Acute cystitis without hematuria -UA noted will send urine for culture, drink more fluids   2. Type 2 diabetes mellitus without complication, without long-term current use of insulin (HCC) will check urine microalbumin continue with current medication   3       htn-blood pressure stable continue with current medication    Orders Placed This Encounter   Procedures    Microalb/Creat Ratio, Random Urine [E]    Urine Culture, Routine [E]       Meds This Visit:  Requested Prescriptions     Signed Prescriptions Disp Refills    lisinopril 10 MG Oral Tab 90 tablet 1     Sig: Take 1 tablet (10 mg total) by mouth daily.       Imaging & Referrals:  None            [1]   Allergies  Allergen Reactions    Aspirin HIVES and SWELLING    Codeine OTHER (SEE COMMENTS)     headache    Seasonal

## 2025-04-27 ENCOUNTER — PATIENT MESSAGE (OUTPATIENT)
Dept: INTERNAL MEDICINE CLINIC | Facility: CLINIC | Age: 75
End: 2025-04-27

## 2025-04-28 NOTE — TELEPHONE ENCOUNTER
Patient was seen on 1/22/25 for cystitis. Last Medicare visit was on 10/16/25.     No future appointments.

## 2025-05-12 ENCOUNTER — OFFICE VISIT (OUTPATIENT)
Dept: INTERNAL MEDICINE CLINIC | Facility: CLINIC | Age: 75
End: 2025-05-12
Payer: MEDICARE

## 2025-05-12 VITALS
WEIGHT: 149 LBS | HEIGHT: 60 IN | OXYGEN SATURATION: 100 % | HEART RATE: 87 BPM | RESPIRATION RATE: 17 BRPM | BODY MASS INDEX: 29.25 KG/M2 | DIASTOLIC BLOOD PRESSURE: 80 MMHG | TEMPERATURE: 98 F | SYSTOLIC BLOOD PRESSURE: 135 MMHG

## 2025-05-12 DIAGNOSIS — D12.6 ADENOMATOUS POLYP OF COLON, UNSPECIFIED PART OF COLON: ICD-10-CM

## 2025-05-12 DIAGNOSIS — E11.9 TYPE 2 DIABETES MELLITUS WITHOUT COMPLICATION, WITHOUT LONG-TERM CURRENT USE OF INSULIN (HCC): Primary | ICD-10-CM

## 2025-05-12 LAB
ALBUMIN SERPL-MCNC: 4.7 G/DL (ref 3.2–4.8)
ALBUMIN/GLOB SERPL: 2.5 {RATIO} (ref 1–2)
ALP LIVER SERPL-CCNC: 90 U/L (ref 55–142)
ALT SERPL-CCNC: 42 U/L (ref 10–49)
ANION GAP SERPL CALC-SCNC: 6 MMOL/L (ref 0–18)
AST SERPL-CCNC: 42 U/L (ref ?–34)
BILIRUB SERPL-MCNC: 0.6 MG/DL (ref 0.2–1.1)
BUN BLD-MCNC: 14 MG/DL (ref 9–23)
BUN/CREAT SERPL: 20.9 (ref 10–20)
CALCIUM BLD-MCNC: 9.7 MG/DL (ref 8.7–10.4)
CHLORIDE SERPL-SCNC: 107 MMOL/L (ref 98–112)
CO2 SERPL-SCNC: 29 MMOL/L (ref 21–32)
CREAT BLD-MCNC: 0.67 MG/DL (ref 0.55–1.02)
EGFRCR SERPLBLD CKD-EPI 2021: 92 ML/MIN/1.73M2 (ref 60–?)
FASTING STATUS PATIENT QL REPORTED: YES
GLOBULIN PLAS-MCNC: 1.9 G/DL (ref 2–3.5)
GLUCOSE BLD-MCNC: 94 MG/DL (ref 70–99)
OSMOLALITY SERPL CALC.SUM OF ELEC: 294 MOSM/KG (ref 275–295)
POTASSIUM SERPL-SCNC: 4.3 MMOL/L (ref 3.5–5.1)
PROT SERPL-MCNC: 6.6 G/DL (ref 5.7–8.2)
SODIUM SERPL-SCNC: 142 MMOL/L (ref 136–145)

## 2025-05-12 PROCEDURE — 99214 OFFICE O/P EST MOD 30 MIN: CPT | Performed by: INTERNAL MEDICINE

## 2025-05-12 PROCEDURE — 1160F RVW MEDS BY RX/DR IN RCRD: CPT | Performed by: INTERNAL MEDICINE

## 2025-05-12 PROCEDURE — 1159F MED LIST DOCD IN RCRD: CPT | Performed by: INTERNAL MEDICINE

## 2025-05-12 PROCEDURE — 1126F AMNT PAIN NOTED NONE PRSNT: CPT | Performed by: INTERNAL MEDICINE

## 2025-05-12 NOTE — PROGRESS NOTES
Subjective:     Patient ID: Jayshree Lau is a 74 year old female.    HPI    History/Other: She came in today for 6 months follow-up.  She states that she is monitoring blood pressure at home is well-controlled.  She is taking her medications regularly.    She also states that her blood sugar is well-controlled.  She is due for hemoglobin A1c.  According to her last week she hit her back on the sink.  She does have some slight discomfort on the lower back pain is 1-2 intensity.  Is not radiating    She also noticed the small bump on palm of her left hand.  It is not tender is not painful  Review of Systems   Constitutional: Negative.    HENT: Negative.     Eyes: Negative.    Respiratory: Negative.     Cardiovascular: Negative.    Gastrointestinal: Negative.    Endocrine: Negative.    Genitourinary: Negative.    Musculoskeletal: Negative.    Skin: Negative.    Neurological: Negative.    Hematological: Negative.    Psychiatric/Behavioral: Negative.       Current Medications[1]  Allergies:Allergies[2]    Past Medical History[3]   Past Surgical History[4]   Family History[5]   Social History: Short Social Hx on File[6]     Objective:   Physical Exam  Vitals and nursing note reviewed.   Constitutional:       Appearance: Normal appearance.   HENT:      Head: Normocephalic and atraumatic.   Cardiovascular:      Rate and Rhythm: Normal rate and regular rhythm.      Pulses: Normal pulses.      Heart sounds: Normal heart sounds.   Pulmonary:      Effort: Pulmonary effort is normal.      Breath sounds: Normal breath sounds.   Abdominal:      Palpations: Abdomen is soft.   Musculoskeletal:      Cervical back: Normal range of motion and neck supple.      Lumbar back: No swelling, edema, deformity, signs of trauma, lacerations, spasms, tenderness or bony tenderness. Normal range of motion. Negative right straight leg raise test and negative left straight leg raise test. No scoliosis.   Skin:     Coloration: Skin is not  jaundiced.   Neurological:      Mental Status: She is alert. Mental status is at baseline.   Psychiatric:         Mood and Affect: Mood normal.     Which time his skin is jaundice monitor.    Assessment & Plan:   1. Type 2 diabetes mellitus without complication, without long-term current use of insulin ([facility]) will check hemoglobin A1c, she is due for eye exam,   2. Adenomatous polyp of colon, unspecified part of colon    Due for repeat colonoscopy and advised her to schedule    3 hypertension controlled continue with current medication  4 lower back painCareful with back. Correct lifting with legs and not with back. Turn body completely to lift something from side. Back exercises. Correct posture when sitting with feet flat on floor and back against chair and computer at eye level.,  Tylenol as needed    Orders Placed This Encounter   Procedures    Hemoglobin A1C [E]    Comp Metabolic Panel (14)       Meds This Visit:  Requested Prescriptions      No prescriptions requested or ordered in this encounter       Imaging & Referrals:  [facility] - INTERNAL            [1]   Current Outpatient Medications   Medication Sig Dispense Refill    lisinopril 10 MG Oral Tab Take 1 tablet (10 mg total) by mouth daily. 90 tablet 1    PARoxetine 20 MG Oral Tab Take 1 tablet (20 mg total) by mouth every morning. 90 tablet 3    metFORMIN 500 MG Oral Tab Take 1 tablet (500 mg total) by mouth 2 (two) times daily with meals. (Patient taking differently: Take 1 tablet (500 mg total) by mouth daily with breakfast.) 180 tablet 1    simvastatin 80 MG Oral Tab Take 1 tablet (80 mg total) by mouth nightly. TAKE AT BEDTIME 90 tablet 3    Fexofenadine HCl (ALLEGRA) 180 MG Oral Tab Take 1 tablet (180 mg total) by mouth daily. 90 tablet 0    Multiple Vitamins-Minerals (MULTI-VITAMIN/MINERALS) Oral Tab Take 1 tablet by mouth daily. 90 tablet 0   [2]   Allergies  Allergen Reactions    Aspirin HIVES and SWELLING    Codeine OTHER (SEE COMMENTS)     headache     Seasonal    [3]   Past Medical History:   Anxiety state, unspecified    Colon polyp    Diabetes mellitus (HCC)    Essential hypertension    Generalized headaches    High blood pressure    High cholesterol    Obesity    Type II or unspecified type diabetes mellitus without mention of complication, not stated as uncontrolled    Unspecified essential hypertension    Unspecified sleep apnea   [4]   Past Surgical History:  Procedure Laterality Date    Breast biopsy Left     Cataract  04/2018    bilateral    Colonoscopy      Colonoscopy N/A 6/7/2017    Procedure: COLONOSCOPY;  Surgeon: Nate Andrade MD;  Location: [facility] ENDOSCOPY    Colonoscopy N/A 10/6/2022    Procedure: COLONOSCOPY;  Surgeon: Nate Andrade MD;  Location: [facility]    Robin localization wire 1 site left (cpt=19281)      2000    Myomectomy 5/> intramural myomas &/or total wt >250 gms, abdominal approach      SP RPSL    Tonsillectomy     [5]   Family History  Problem Relation Age of Onset    Heart Disease Mother     Hypertension Mother     Gastro-Intestinal Disorder Father         GI bleed    Arthritis Father         Rheumatoid    Hypertension Sister     Asthma Sister     Psychiatric Sister     Dementia Sister     Ovarian Cancer Paternal Aunt         40s    Dementia Brother    [6]   Social History  Socioeconomic History    Marital status: Single   Tobacco Use    Smoking status: Never    Smokeless tobacco: Never   Substance and Sexual Activity    Alcohol use: Yes     Alcohol/week: 1.0 standard drink of alcohol     Comment: Occasionally once a week or less    Drug use: No   Other Topics Concern    Caffeine Concern Yes     Comment: Chocolate, soda, coffee

## 2025-05-13 LAB
EST. AVERAGE GLUCOSE BLD GHB EST-MCNC: 111 MG/DL (ref 68–126)
HBA1C MFR BLD: 5.5 % (ref ?–5.7)

## 2025-07-14 ENCOUNTER — TELEPHONE (OUTPATIENT)
Facility: CLINIC | Age: 75
End: 2025-07-14

## 2025-07-23 ENCOUNTER — TELEPHONE (OUTPATIENT)
Dept: INTERNAL MEDICINE CLINIC | Facility: CLINIC | Age: 75
End: 2025-07-23

## 2025-07-23 NOTE — TELEPHONE ENCOUNTER
Called the patient first on her mobile number which was not in working order.  Called the home phone and I was able to leave a message to have her call me back to let me know if she has had her diabetic eye exam and her colorectal screening. Left out return call number.

## 2025-08-15 RX ORDER — SIMVASTATIN 80 MG
80 TABLET ORAL NIGHTLY
Qty: 90 TABLET | Refills: 3 | Status: SHIPPED | OUTPATIENT
Start: 2025-08-15

## 2025-08-19 DIAGNOSIS — R29.898 DEFICIENCIES OF LIMBS: Primary | ICD-10-CM

## 2025-08-24 ENCOUNTER — HOSPITAL ENCOUNTER (OUTPATIENT)
Dept: MRI IMAGING | Age: 75
End: 2025-08-24
Attending: NURSE PRACTITIONER

## 2025-08-24 DIAGNOSIS — R29.898 DEFICIENCIES OF LIMBS: ICD-10-CM

## 2025-08-24 PROCEDURE — 70551 MRI BRAIN STEM W/O DYE: CPT

## 2025-08-25 DIAGNOSIS — R29.898 LEFT ARM WEAKNESS: Primary | ICD-10-CM

## (undated) DEVICE — ENDOSCOPY PACK - LOWER: Brand: MEDLINE INDUSTRIES, INC.

## (undated) DEVICE — MEDI-VAC NON-CONDUCTIVE SUCTION TUBING 6MM X 1.8M (6FT.) L: Brand: CARDINAL HEALTH

## (undated) DEVICE — SNARE CAPTIFLEX MICRO-OVL OLY

## (undated) DEVICE — SNARE OPTMZ PLPCTM TRP

## (undated) DEVICE — KIT ENDO ORCAPOD 160/180/190

## (undated) DEVICE — Device: Brand: DEFENDO AIR/WATER/SUCTION AND BIOPSY VALVE

## (undated) DEVICE — 35 ML SYRINGE REGULAR TIP: Brand: MONOJECT

## (undated) DEVICE — LINE MNTR ADLT SET O2 INTMD

## (undated) DEVICE — KIT CLEAN ENDOKIT 1.1OZ GOWNX2

## (undated) DEVICE — TRAP 4 CPTR CHMBR N EZ INLN

## (undated) NOTE — IP AVS SNAPSHOT
[facility]P - [facility]    [address], [address], [place] ~ [phone]                Discharge Summary   6/7/2017    [facility]           Admission Information        Provider Department    6/7/2017 Leta Danielle MD E - You may notice some rectal \"spotting\" (a little blood on the toilet tissue) for a day or two after the exam. This is normal.  - If you experience any rectal bleeding (not spotting), persistent tenderness or sharp severe abdominal pains, oral temperatur [facility] [address] to help you get signed up for insurance coverage. Patient [address] is a Federal Navigator program that can help with your Affordable Care Act coverage, as well as all types of Medicaid plans.   To get signed up and covere

## (undated) NOTE — MR AVS SNAPSHOT
[address] [facility]  [address] [address]  [phone]               Thank you for choosing us for your health care visit with Jake Crow MD.  We are glad to serve you and happy to provide you with this summary of your visit. Commonly known as:  GLUCOPHAGE           MULTI-VITAMIN/MINERALS Tabs   Take 1 tablet by mouth daily. PARoxetine HCl 20 MG Tabs   TAKE 1 TABLET BY MOUTH EVERY MORNING   What changed:  Another medication with the same name was removed.  Continue jennifer office. At that time, you will be provided with any authorization numbers or be assured that none are required. You can then schedule your appointment. Failure to obtain required authorization numbers can create reimbursement difficulties for you.         P

## (undated) NOTE — LETTER
7/8/2020    [facility]        [address]            Dear [facility],      Our records indicate that you are due for an appointment for a Colonoscopy with Domi Aviles MD.    Please call our [facility]

## (undated) NOTE — MR AVS SNAPSHOT
[address] [facility] [phone]               Thank you for choosing us for your health care visit with [facility].   We are glad to serve you and happy to provide you with [facility] Commonly known as:  GLUCOPHAGE           MULTI-VITAMIN/MINERALS Tabs   Take 1 tablet by mouth daily.            PARoxetine HCl 20 MG Tabs   TAKE 1 TABLET BY MOUTH EVERY MORNING   Commonly known as:  PAXIL           simvastatin 80 MG Tabs   TAKE 1 TABLET BY

## (undated) NOTE — MR AVS SNAPSHOT
[address] [facility] [address]  [phone]               Thank you for choosing us for your health care visit with Magen Nelson MD.  We are glad to serve you and happy to provide you with this summary of your Follow Up Visit with Jon Moore MD   [facility], [address], [facility] )    [address] [facility] [id number] [phone]              Allergies as of Feb 06, 2017     Aspirin     Other reaction(s): Unknown    C Assoc Dx:  Type 2 diabetes mellitus without complication, without long-term current use of insulin (HCC) [E11.9]           A1C    Complete by:  As directed    Assoc Dx:  Type 2 diabetes mellitus without complication, without long-term current use of insul If you've recently had a stay at the Hospital you can access your discharge instructions in [facility] by going to Visits < Admission Summaries.  If you've been to the Emergency Department or your doctor's office, you can view your past visit information in My Visit [facility] online at  [email or web address]

## (undated) NOTE — MR AVS SNAPSHOT
[facility]  [address] [address]  [phone]               Thank you for choosing us for your health care visit with Tori Pozo MD.  We are glad to serve you and happy to provide you with this summary of your vis Take 1 tablet (10 mg total) by mouth once daily. Commonly known as:  PRINIVIL,ZESTRIL           MetFORMIN HCl 500 MG Tabs   Take 1 tablet (500 mg total) by mouth 2 (two) times daily with meals.    Commonly known as:  GLUCOPHAGE           MULTI-VITAMIN/MIN

## (undated) NOTE — MR AVS SNAPSHOT
[facility]  [address] [place] [address] [place] [address]  [phone]               Thank you for choosing us for your health care visit with Natalia Austin MD.  We are glad to serve you and happy to provide you with this summary of your visit. Commonly known as:  FLONASE           lisinopril 10 MG Tabs   Take 1 tablet (10 mg total) by mouth once daily.    Commonly known as:  PRINIVIL,ZESTRIL           MetFORMIN HCl 500 MG Tabs   Take 1 tablet (500 mg total) by mouth 2 (two) times daily with meals

## (undated) NOTE — Clinical Note
January 13, 2017         Zuleima Martinez MD  [address]      Patient: Tamara De Jesus   YOB: 1950   Date of Visit: 1/12/2017       Dear  Carol Chatman  :           As you know, Casey Chatman is a 52-year-old no depression but mild anxiety, occasional skin rash, muscle and joints okay, no thyroid disease, weight fluctuates by a few pounds    PHYSICAL EXAMINATION: Vital signs normal. HEENT examination is unremarkable with pupils equal round and reactive to light

## (undated) NOTE — Clinical Note
Becky Jeter, [address] [place]       01/12/2017        Patient: Ophelia Pardo   YOB: 1950   Date of Visit: 1/12/2017       Dear  Elias Kumar  :           As you know, Rahel Anguiano is a 64-year-old female no depression but mild anxiety, occasional skin rash, muscle and joints okay, no thyroid disease, weight fluctuates by a few pounds    PHYSICAL EXAMINATION: Vital signs normal. HEENT examination is unremarkable with pupils equal round and reactive to light